# Patient Record
Sex: MALE | Race: BLACK OR AFRICAN AMERICAN | Employment: FULL TIME | URBAN - METROPOLITAN AREA
[De-identification: names, ages, dates, MRNs, and addresses within clinical notes are randomized per-mention and may not be internally consistent; named-entity substitution may affect disease eponyms.]

---

## 2017-05-01 NOTE — ED PROVIDER NOTES
Patient Seen in: THE [facility] In [place] END    History   Patient presents with:  Eval-G (gynecologic)  Mouth/Lip Problem    Stated Complaint: STD check    HPI  21-year-old male coming in with complaints of a funny cool sensation at the urethra, elements reviewed from today and agreed except as otherwise stated in HPI.     Physical Exam       ED Triage Vitals   BP 05/01/17 1225 114/71 mmHg   Pulse 05/01/17 1225 72   Resp 05/01/17 1225 16   Temp 05/01/17 1225 98.3 °F (36.8 °C)   Temp src 05/01/17 12 Encounter  POCT urinalysis dipstick Once  Chlamydia/Gc Amplification Once  CefTRIAXone Sodium (ROCEPHIN) 250 mg in Lidocaine HCl (XYLOCAINE) IM only syringe   Sig:   Order Specific Question: What infection is this being used to treat?   Answer: Sexually tra herpes labialis open (cold sores)  Monitor for signs of secondary infection.       Disposition and Plan     Clinical Impression:  High risk heterosexual behavior  (primary encounter diagnosis)  Routine screening for STI (sexually transmitted infection)  Hem

## 2017-07-31 ENCOUNTER — APPOINTMENT (OUTPATIENT)
Dept: ULTRASOUND IMAGING | Age: 22
End: 2017-07-31
Attending: PHYSICIAN ASSISTANT
Payer: COMMERCIAL

## 2017-07-31 ENCOUNTER — HOSPITAL ENCOUNTER (OUTPATIENT)
Age: 22
Discharge: HOME | End: 2017-07-31
Payer: COMMERCIAL

## 2017-07-31 VITALS
SYSTOLIC BLOOD PRESSURE: 130 MMHG | RESPIRATION RATE: 16 BRPM | BODY MASS INDEX: 23 KG/M2 | HEART RATE: 76 BPM | OXYGEN SATURATION: 98 % | DIASTOLIC BLOOD PRESSURE: 84 MMHG | TEMPERATURE: 98 F | WEIGHT: 185 LBS

## 2017-07-31 DIAGNOSIS — N50.3 EPIDIDYMAL CYST: Primary | ICD-10-CM

## 2017-07-31 DIAGNOSIS — R55 NEAR SYNCOPE: ICD-10-CM

## 2017-07-31 DIAGNOSIS — I86.1 VARICOCELE: ICD-10-CM

## 2017-07-31 LAB
#LYMPHOCYTE IC: 2.5 X10ˆ3/UL (ref 0.9–3.2)
#MXD IC: 0.6 X10ˆ3/UL (ref 0.1–1)
#NEUTROPHIL IC: 1.7 X10ˆ3/UL (ref 1.3–6.7)
CREAT SERPL-MCNC: 0.8 MG/DL (ref 0.7–1.2)
GLUCOSE BLD-MCNC: 84 MG/DL (ref 65–99)
HCT IC: 45.2 % (ref 37–54)
HGB IC: 15 G/DL (ref 13–17)
ISTAT BLOOD GAS TCO2: 27 MMOL/L (ref 22–32)
ISTAT BUN: 10 MG/DL (ref 8–20)
ISTAT CHLORIDE: 102 MMOL/L (ref 101–111)
ISTAT HEMATOCRIT: 47 % (ref 37–54)
ISTAT IONIZED CALCIUM: 1.21 MMOL/L (ref 1.12–1.32)
ISTAT POTASSIUM: 4.5 MMOL/L (ref 3.6–5.1)
ISTAT SODIUM: 140 MMOL/L (ref 136–144)
LYMPHOCYTES NFR BLD AUTO: 52.2 %
MCH IC: 28.2 PG (ref 27–33.2)
MCHC IC: 33.2 G/DL (ref 31–37)
MCV IC: 85 FL (ref 80–99)
MIXED CELL %: 11.8 %
NEUTROPHILS NFR BLD AUTO: 36 %
PLT IC: 277 X10ˆ3/UL (ref 150–450)
POCT BILIRUBIN URINE: NEGATIVE
POCT BLOOD URINE: NEGATIVE
POCT GLUCOSE URINE: NEGATIVE MG/DL
POCT KETONE URINE: NEGATIVE MG/DL
POCT LEUKOCYTE ESTERASE URINE: NEGATIVE
POCT NITRITE URINE: NEGATIVE
POCT PH URINE: 6 (ref 5–8)
POCT SPECIFIC GRAVITY URINE: 1.02
POCT URINE COLOR: YELLOW
POCT UROBILINOGEN URINE: 0.2 MG/DL
RBC IC: 5.32 X10ˆ6/UL (ref 4.3–5.7)
WBC IC: 4.8 X10ˆ3/UL (ref 4–13)

## 2017-07-31 PROCEDURE — 99215 OFFICE O/P EST HI 40 MIN: CPT

## 2017-07-31 PROCEDURE — 36415 COLL VENOUS BLD VENIPUNCTURE: CPT

## 2017-07-31 PROCEDURE — 93975 VASCULAR STUDY: CPT | Performed by: PHYSICIAN ASSISTANT

## 2017-07-31 PROCEDURE — 93005 ELECTROCARDIOGRAM TRACING: CPT

## 2017-07-31 PROCEDURE — 80047 BASIC METABLC PNL IONIZED CA: CPT

## 2017-07-31 PROCEDURE — 87591 N.GONORRHOEAE DNA AMP PROB: CPT | Performed by: PHYSICIAN ASSISTANT

## 2017-07-31 PROCEDURE — 81002 URINALYSIS NONAUTO W/O SCOPE: CPT | Performed by: PHYSICIAN ASSISTANT

## 2017-07-31 PROCEDURE — 87529 HSV DNA AMP PROBE: CPT | Performed by: PHYSICIAN ASSISTANT

## 2017-07-31 PROCEDURE — 87491 CHLMYD TRACH DNA AMP PROBE: CPT | Performed by: PHYSICIAN ASSISTANT

## 2017-07-31 PROCEDURE — 93010 ELECTROCARDIOGRAM REPORT: CPT

## 2017-07-31 PROCEDURE — 76870 US EXAM SCROTUM: CPT | Performed by: PHYSICIAN ASSISTANT

## 2017-07-31 PROCEDURE — 85025 COMPLETE CBC W/AUTO DIFF WBC: CPT | Performed by: PHYSICIAN ASSISTANT

## 2017-07-31 PROCEDURE — 99214 OFFICE O/P EST MOD 30 MIN: CPT

## 2017-07-31 NOTE — ED PROVIDER NOTES
Patient Seen in: [address] [facility] In [facility]    History   Patient presents with:  Abdomen/Flank Pain (GI/)    Stated Complaint: abd pain/groin pain/ x 3 days    HPI    Patient is a 27-year-old male who arrives for evaluation of multiple compla reviewed and negative except as noted above. PSFH elements reviewed from today and agreed except as otherwise stated in HPI.     Physical Exam   ED Triage Vitals [07/31/17 1158]  BP: 129/82  Pulse: 111  Resp: 20  Temp: 98.4 °F (36.9 °C)  Temp src: Tempor noted           ============================================================  ED Course  ------------------------------------------------------------   Us Scrotum W/ Doppler (cpt=93975/72321)    Result Date: 7/31/2017  PROCEDURE:  TESTICULAR ULTRASOUND WIT right epididymal cyst.  No torsion. No mass. I discussed these findings with patient. We discussed tighter fitting underwear, ice and ibuprofen. Decreased physical activity. Optional follow-up with urology. GC chlamydia are pending.   HSV PCR is pendi

## 2017-07-31 NOTE — ED INITIAL ASSESSMENT (HPI)
Patient states he was trying to have sexual intercourse the other day and experienced pain. Patient states he also had some redness to the throat and pain that has gone away.

## 2017-08-02 LAB
C TRACH DNA SPEC QL NAA+PROBE: NEGATIVE
HSV 1 SUBTYPE BY PCR: NOT DETECTED
HSV 2 SUBTYPE BY PCR: NOT DETECTED
N GONORRHOEA DNA SPEC QL NAA+PROBE: NEGATIVE

## 2017-11-17 ENCOUNTER — APPOINTMENT (OUTPATIENT)
Dept: GENERAL RADIOLOGY | Age: 22
End: 2017-11-17
Attending: FAMILY MEDICINE
Payer: COMMERCIAL

## 2017-11-17 ENCOUNTER — HOSPITAL ENCOUNTER (OUTPATIENT)
Age: 22
Discharge: HOME | End: 2017-11-17
Attending: FAMILY MEDICINE
Payer: COMMERCIAL

## 2017-11-17 VITALS
DIASTOLIC BLOOD PRESSURE: 75 MMHG | WEIGHT: 190 LBS | HEIGHT: 75 IN | SYSTOLIC BLOOD PRESSURE: 115 MMHG | RESPIRATION RATE: 16 BRPM | TEMPERATURE: 99 F | HEART RATE: 80 BPM | OXYGEN SATURATION: 99 % | BODY MASS INDEX: 23.62 KG/M2

## 2017-11-17 DIAGNOSIS — M62.830 BACK MUSCLE SPASM: ICD-10-CM
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
## 2017-11-17 DIAGNOSIS — S09.90XA INJURY OF HEAD, INITIAL ENCOUNTER: ICD-10-CM

## 2017-11-17 DIAGNOSIS — S16.1XXA STRAIN OF NECK MUSCLE, INITIAL ENCOUNTER: ICD-10-CM

## 2017-11-17 PROCEDURE — 96372 THER/PROPH/DIAG INJ SC/IM: CPT

## 2017-11-17 PROCEDURE — 99214 OFFICE O/P EST MOD 30 MIN: CPT

## 2017-11-17 PROCEDURE — 72110 X-RAY EXAM L-2 SPINE 4/>VWS: CPT | Performed by: FAMILY MEDICINE

## 2017-11-17 PROCEDURE — 72072 X-RAY EXAM THORAC SPINE 3VWS: CPT | Performed by: FAMILY MEDICINE

## 2017-11-17 RX ORDER — DIAZEPAM 2 MG/1
2 TABLET ORAL ONCE
Status: COMPLETED | OUTPATIENT
Start: 2017-11-17 | End: 2017-11-17

## 2017-11-17 RX ORDER — NAPROXEN 500 MG/1
500 TABLET ORAL 2 TIMES DAILY PRN
Qty: 20 TABLET | Refills: 0 | Status: SHIPPED | OUTPATIENT
Start: 2017-11-17 | End: 2017-11-24

## 2017-11-17 RX ORDER — KETOROLAC TROMETHAMINE 30 MG/ML
60 INJECTION, SOLUTION INTRAMUSCULAR; INTRAVENOUS ONCE
Status: COMPLETED | OUTPATIENT
Start: 2017-11-17 | End: 2017-11-17

## 2017-11-17 RX ORDER — CYCLOBENZAPRINE HCL 10 MG
TABLET ORAL 3 TIMES DAILY PRN
Qty: 12 TABLET | Refills: 0 | Status: SHIPPED | OUTPATIENT
Start: 2017-11-17 | End: 2017-11-20
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
## 2017-11-24 ENCOUNTER — TELEPHONE (OUTPATIENT)
Dept: FAMILY MEDICINE CLINIC | Facility: CLINIC | Age: 22
End: 2017-11-24
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
## 2018-01-01 ENCOUNTER — APPOINTMENT (OUTPATIENT)
Dept: GENERAL RADIOLOGY | Age: 23
End: 2018-01-01
Attending: FAMILY MEDICINE
Payer: COMMERCIAL

## 2018-01-01 ENCOUNTER — HOSPITAL ENCOUNTER (OUTPATIENT)
Age: 23
Discharge: HOME | End: 2018-01-01
Attending: FAMILY MEDICINE
Payer: COMMERCIAL

## 2018-01-01 VITALS
HEIGHT: 74 IN | DIASTOLIC BLOOD PRESSURE: 85 MMHG | TEMPERATURE: 100 F | WEIGHT: 180 LBS | OXYGEN SATURATION: 100 % | RESPIRATION RATE: 18 BRPM | HEART RATE: 107 BPM | SYSTOLIC BLOOD PRESSURE: 120 MMHG | BODY MASS INDEX: 23.1 KG/M2

## 2018-01-01 DIAGNOSIS — F07.81 POST CONCUSSION SYNDROME: ICD-10-CM

## 2018-01-01 DIAGNOSIS — G44.321 INTRACTABLE CHRONIC POST-TRAUMATIC HEADACHE: ICD-10-CM

## 2018-01-01 DIAGNOSIS — S39.92XD INJURY OF LOW BACK, SUBSEQUENT ENCOUNTER: ICD-10-CM

## 2018-01-01 DIAGNOSIS — R07.89 MUSCULOSKELETAL CHEST PAIN: Primary | ICD-10-CM

## 2018-01-01 PROCEDURE — 99214 OFFICE O/P EST MOD 30 MIN: CPT

## 2018-01-01 PROCEDURE — 72110 X-RAY EXAM L-2 SPINE 4/>VWS: CPT | Performed by: FAMILY MEDICINE

## 2018-01-01 PROCEDURE — 71046 X-RAY EXAM CHEST 2 VIEWS: CPT | Performed by: FAMILY MEDICINE

## 2018-01-01 PROCEDURE — 96372 THER/PROPH/DIAG INJ SC/IM: CPT

## 2018-01-01 RX ORDER — MECLIZINE HCL 12.5 MG/1
25 TABLET ORAL ONCE
Status: COMPLETED | OUTPATIENT
Start: 2018-01-01 | End: 2018-01-01

## 2018-01-01 RX ORDER — HYDROCODONE BITARTRATE AND ACETAMINOPHEN 5; 325 MG/1; MG/1
1 TABLET ORAL EVERY 6 HOURS PRN
Qty: 30 TABLET | Refills: 1 | Status: SHIPPED | OUTPATIENT
Start: 2018-01-01 | End: 2018-01-08

## 2018-01-01 RX ORDER — KETOROLAC TROMETHAMINE 30 MG/ML
60 INJECTION, SOLUTION INTRAMUSCULAR; INTRAVENOUS ONCE
Status: COMPLETED | OUTPATIENT
Start: 2018-01-01 | End: 2018-01-01

## 2018-01-01 RX ORDER — CYCLOBENZAPRINE HCL 10 MG
10 TABLET ORAL 3 TIMES DAILY PRN
Qty: 24 TABLET | Refills: 0 | Status: SHIPPED | OUTPATIENT
Start: 2018-01-01 | End: 2018-01-08
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
## 2018-01-08 ENCOUNTER — OFFICE VISIT (OUTPATIENT)
Dept: INTERNAL MEDICINE CLINIC | Facility: CLINIC | Age: 23
End: 2018-01-08

## 2018-01-08 VITALS
HEIGHT: 75 IN | SYSTOLIC BLOOD PRESSURE: 110 MMHG | TEMPERATURE: 99 F | DIASTOLIC BLOOD PRESSURE: 70 MMHG | BODY MASS INDEX: 23.75 KG/M2 | RESPIRATION RATE: 16 BRPM | OXYGEN SATURATION: 99 % | WEIGHT: 191 LBS | HEART RATE: 104 BPM

## 2018-01-08 DIAGNOSIS — R42 DIZZINESS: ICD-10-CM

## 2018-01-08 DIAGNOSIS — G89.29 CHRONIC MIDLINE LOW BACK PAIN WITHOUT SCIATICA: ICD-10-CM

## 2018-01-08 DIAGNOSIS — G44.309 POST-CONCUSSION HEADACHE: Primary | ICD-10-CM

## 2018-01-08 DIAGNOSIS — M54.50 CHRONIC MIDLINE LOW BACK PAIN WITHOUT SCIATICA: ICD-10-CM

## 2018-01-08 DIAGNOSIS — H53.2 DIPLOPIA: ICD-10-CM
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
## 2018-01-08 DIAGNOSIS — Z72.0 TOBACCO ABUSE: ICD-10-CM

## 2018-01-08 PROCEDURE — 99204 OFFICE O/P NEW MOD 45 MIN: CPT | Performed by: PHYSICIAN ASSISTANT

## 2018-01-08 RX ORDER — METHYLPREDNISOLONE 4 MG/1
TABLET ORAL
Qty: 1 KIT | Refills: 0 | Status: SHIPPED | OUTPATIENT
Start: 2018-01-08 | End: 2018-06-20 | Stop reason: ALTCHOICE

## 2018-01-08 RX ORDER — AMITRIPTYLINE HYDROCHLORIDE 25 MG/1
25 TABLET, FILM COATED ORAL NIGHTLY
Qty: 30 TABLET | Refills: 0 | Status: SHIPPED | OUTPATIENT
Start: 2018-01-08 | End: 2018-06-20 | Stop reason: ALTCHOICE
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
## 2018-01-08 NOTE — PATIENT INSTRUCTIONS
Please call Jaison Gallegos at [phone] to set up the following tests:  - MRI of brain  - MRI of lumbar spine    Headaches:  - start amitriptyline 25 mg - take 1 tablet each night before bed  - follow up with neurology (Dr. Clau Schwartz

## 2018-01-10 PROBLEM — F07.81 POST CONCUSSION SYNDROME: Status: ACTIVE | Noted: 2018-01-10
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
## 2018-01-10 NOTE — PATIENT INSTRUCTIONS
Refill policies:    • Allow 2-3 business days for refills; controlled substances may take longer.   • Contact your pharmacy at least 5 days prior to running out of medication and have them send an electronic request or submit request through the [facility] have a procedure or additional testing performed. MARIANA REAVES [facility]) will contact your insurance carrier to obtain pre-certification or prior authorization.     Unfortunately, [facility] has seen an increase in denial of payment even though the p

## 2018-01-10 NOTE — PROGRESS NOTES
Neurology H&P    Wyline Meter Patient Status:  No patient class for patient encounter    1995 MRN [id number]   Location [facility], [address], [address] Attending No att. providers found   [facility] Day # 0 PCP Marie Winslow MD MG Oral Tab Take 1 tablet (25 mg total) by mouth nightly.  Disp: 30 tablet Rfl: 0       Problem List:  Patient Active Problem List:     Tobacco abuse      PMHx:  Past Medical History:   Diagnosis Date   • Migraines        PSHx:  No past surgical history on elevation intact, no dysarthria, no tongue fasciculations or atrophy, strong shoulder shrug.     MOTOR EXAMINATION: normal tone, no fasciculations, normal strength throughout in UEs and LEs     SENSORY EXAMINATION:  UE: intact to light touch, pinprick is sp

## 2018-01-26 ENCOUNTER — TELEPHONE (OUTPATIENT)
Dept: NEUROLOGY | Facility: CLINIC | Age: 23
End: 2018-01-26

## 2018-06-20 ENCOUNTER — OFFICE VISIT (OUTPATIENT)
Dept: INTERNAL MEDICINE CLINIC | Facility: CLINIC | Age: 23
End: 2018-06-20

## 2018-06-20 ENCOUNTER — APPOINTMENT (OUTPATIENT)
Dept: LAB | Age: 23
End: 2018-06-20
Attending: PHYSICIAN ASSISTANT
Payer: MEDICAID

## 2018-06-20 VITALS
HEART RATE: 71 BPM | TEMPERATURE: 99 F | RESPIRATION RATE: 14 BRPM | WEIGHT: 191 LBS | SYSTOLIC BLOOD PRESSURE: 118 MMHG | OXYGEN SATURATION: 98 % | DIASTOLIC BLOOD PRESSURE: 80 MMHG | HEIGHT: 75 IN | BODY MASS INDEX: 23.75 KG/M2

## 2018-06-20 DIAGNOSIS — Z11.3 SCREEN FOR STD (SEXUALLY TRANSMITTED DISEASE): ICD-10-CM

## 2018-06-20 DIAGNOSIS — M54.50 CHRONIC BILATERAL LOW BACK PAIN WITHOUT SCIATICA: ICD-10-CM

## 2018-06-20 DIAGNOSIS — G89.29 CHRONIC BILATERAL LOW BACK PAIN WITHOUT SCIATICA: ICD-10-CM

## 2018-06-20 DIAGNOSIS — Z00.00 ANNUAL PHYSICAL EXAM: ICD-10-CM

## 2018-06-20 DIAGNOSIS — F07.81 POST CONCUSSION SYNDROME: ICD-10-CM

## 2018-06-20 DIAGNOSIS — Z00.00 ANNUAL PHYSICAL EXAM: Primary | ICD-10-CM

## 2018-06-20 PROCEDURE — 99395 PREV VISIT EST AGE 18-39: CPT | Performed by: PHYSICIAN ASSISTANT

## 2018-06-20 PROCEDURE — 87591 N.GONORRHOEAE DNA AMP PROB: CPT

## 2018-06-20 PROCEDURE — 87491 CHLMYD TRACH DNA AMP PROBE: CPT
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
## 2018-06-20 NOTE — PATIENT INSTRUCTIONS
Please get the following vaccines at the pharmacy or [facility] department:  - Varicella (chicken pox)  - Gardasil (HPV)  *please bring documentation for Dawson Pulliam    Please call Jaison Gallegos at [phone] to set up the following t

## 2018-06-21 DIAGNOSIS — Z11.3 SCREEN FOR STD (SEXUALLY TRANSMITTED DISEASE): Primary | ICD-10-CM

## 2018-06-21 RX ORDER — AZITHROMYCIN 500 MG/1
1000 TABLET, FILM COATED ORAL ONCE
Qty: 2 TABLET | Refills: 0 | Status: SHIPPED | OUTPATIENT
Start: 2018-06-21 | End: 2018-06-21

## 2018-06-22 ENCOUNTER — TELEPHONE (OUTPATIENT)
Dept: INTERNAL MEDICINE CLINIC | Facility: CLINIC | Age: 23
End: 2018-06-22

## 2018-06-22 RX ORDER — AZITHROMYCIN 500 MG/1
500 TABLET, FILM COATED ORAL DAILY
Qty: 2 TABLET | Refills: 0 | Status: SHIPPED | OUTPATIENT
Start: 2018-06-22 | End: 2018-08-10 | Stop reason: ALTCHOICE

## 2018-08-10 ENCOUNTER — HOSPITAL ENCOUNTER (OUTPATIENT)
Age: 23
Discharge: HOME | End: 2018-08-10
Attending: FAMILY MEDICINE
Payer: MEDICAID

## 2018-08-10 VITALS
TEMPERATURE: 98 F | OXYGEN SATURATION: 100 % | DIASTOLIC BLOOD PRESSURE: 65 MMHG | RESPIRATION RATE: 18 BRPM | HEART RATE: 86 BPM | SYSTOLIC BLOOD PRESSURE: 124 MMHG

## 2018-08-10 DIAGNOSIS — R21 RASH: ICD-10-CM

## 2018-08-10 DIAGNOSIS — Z71.1 CONCERN ABOUT STD IN MALE WITHOUT DIAGNOSIS: Primary | ICD-10-CM

## 2018-08-10 LAB
POCT BILIRUBIN URINE: NEGATIVE
POCT BLOOD URINE: NEGATIVE
POCT GLUCOSE URINE: NEGATIVE MG/DL
POCT KETONE URINE: NEGATIVE MG/DL
POCT LEUKOCYTE ESTERASE URINE: NEGATIVE
POCT NITRITE URINE: NEGATIVE
POCT PH URINE: 6.5 (ref 5–8)
POCT PROTEIN URINE: 30 MG/DL
POCT SPECIFIC GRAVITY URINE: 1.03
POCT URINE CLARITY: CLEAR
POCT URINE COLOR: YELLOW
POCT UROBILINOGEN URINE: 1 MG/DL

## 2018-08-10 PROCEDURE — 81002 URINALYSIS NONAUTO W/O SCOPE: CPT | Performed by: FAMILY MEDICINE

## 2018-08-10 PROCEDURE — 99213 OFFICE O/P EST LOW 20 MIN: CPT

## 2018-08-10 PROCEDURE — 87491 CHLMYD TRACH DNA AMP PROBE: CPT | Performed by: FAMILY MEDICINE

## 2018-08-10 PROCEDURE — 87591 N.GONORRHOEAE DNA AMP PROB: CPT | Performed by: FAMILY MEDICINE

## 2018-08-10 RX ORDER — TRIAMCINOLONE ACETONIDE 0.25 MG/G
1 CREAM TOPICAL 2 TIMES DAILY
Qty: 15 G | Refills: 0 | Status: SHIPPED | OUTPATIENT
Start: 2018-08-10 | End: 2018-10-17

## 2018-08-10 NOTE — ED PROVIDER NOTES
Patient Seen in: [facility]    History   Patient presents with:  STD    Stated Complaint: \"needs STD shot,' x7days     HPI    21-year-old male comes immediate care secondary to recent treatment for chlamydia.   Patient was posit Result Value    Protein urine 30  (*)     All other components within normal limits   CHLAMYDIA/GONOCOCCUS, AIDAN       ED Course as of Aug 10 1100  ------------------------------------------------------------      MDM   Patient was positive for chlamydia in

## 2018-08-10 NOTE — ED INITIAL ASSESSMENT (HPI)
Seen 2 weeks ago at PCP office and told he had Chlamydia. Given \"2 pills\" to take. Sts that he took them, but sts that he had drank alcohol and was told that made the pills ineffective. Sts that he still has some burning w/ urination.

## 2018-08-12 LAB
C TRACH DNA SPEC QL NAA+PROBE: NEGATIVE
N GONORRHOEA DNA SPEC QL NAA+PROBE: NEGATIVE

## 2018-10-17 ENCOUNTER — LAB ENCOUNTER (OUTPATIENT)
Dept: LAB | Age: 23
End: 2018-10-17
Attending: INTERNAL MEDICINE
Payer: MEDICAID

## 2018-10-17 ENCOUNTER — OFFICE VISIT (OUTPATIENT)
Dept: INTERNAL MEDICINE CLINIC | Facility: CLINIC | Age: 23
End: 2018-10-17
Payer: MEDICAID

## 2018-10-17 VITALS
HEIGHT: 75 IN | HEART RATE: 80 BPM | BODY MASS INDEX: 23.81 KG/M2 | RESPIRATION RATE: 18 BRPM | WEIGHT: 191.5 LBS | DIASTOLIC BLOOD PRESSURE: 60 MMHG | SYSTOLIC BLOOD PRESSURE: 108 MMHG | TEMPERATURE: 99 F

## 2018-10-17 DIAGNOSIS — R36.9 PENILE DISCHARGE: ICD-10-CM

## 2018-10-17 DIAGNOSIS — R30.0 DYSURIA: ICD-10-CM

## 2018-10-17 DIAGNOSIS — R36.9 PENILE DISCHARGE: Primary | ICD-10-CM

## 2018-10-17 PROCEDURE — 87086 URINE CULTURE/COLONY COUNT: CPT

## 2018-10-17 PROCEDURE — 87491 CHLMYD TRACH DNA AMP PROBE: CPT

## 2018-10-17 PROCEDURE — 36415 COLL VENOUS BLD VENIPUNCTURE: CPT

## 2018-10-17 PROCEDURE — 99213 OFFICE O/P EST LOW 20 MIN: CPT | Performed by: INTERNAL MEDICINE

## 2018-10-17 PROCEDURE — 81003 URINALYSIS AUTO W/O SCOPE: CPT

## 2018-10-17 PROCEDURE — 87591 N.GONORRHOEAE DNA AMP PROB: CPT

## 2018-10-17 PROCEDURE — 86780 TREPONEMA PALLIDUM: CPT

## 2018-10-17 PROCEDURE — 87389 HIV-1 AG W/HIV-1&-2 AB AG IA: CPT

## 2018-10-17 RX ORDER — TRIAMCINOLONE ACETONIDE 0.25 MG/G
1 CREAM TOPICAL 2 TIMES DAILY
Qty: 80 G | Refills: 1 | Status: SHIPPED | OUTPATIENT
Start: 2018-10-17 | End: 2019-10-11

## 2018-10-17 NOTE — PATIENT INSTRUCTIONS
- Get testing done across the mock at the immediate care  - We will contact you with the results and send in a prescription if needed. - Steroid cream refilled to you pharmacy. It was a pleasure seeing you in the clinic today.   Thank you for choosing t

## 2018-10-17 NOTE — PROGRESS NOTES
Bridget Fallon is a 26year old male. HPI:   Patient presents with:  Chlamydia: patient thinks he may have chlamydia. did have intercourse a few weeks ago. Patient presents with acute genital symptoms.    Dealing with some dysuria - tingling be 180 lb  01/08/18 : 191 lb  01/01/18 : 180 lb  11/17/17 : 190 lb    EXAM:   /60 (BP Location: Left arm, Patient Position: Sitting, Cuff Size: adult)   Pulse 80   Temp 98.6 °F (37 °C) (Oral)   Resp 18   Ht 75\"   Wt 191 lb 8 oz   BMI 23.94 kg/m²   GENE

## 2018-10-19 ENCOUNTER — TELEPHONE (OUTPATIENT)
Dept: INTERNAL MEDICINE CLINIC | Facility: CLINIC | Age: 23
End: 2018-10-19

## 2018-10-19 RX ORDER — AZITHROMYCIN 500 MG/1
1000 TABLET, FILM COATED ORAL ONCE
Qty: 2 TABLET | Refills: 0 | Status: SHIPPED | OUTPATIENT
Start: 2018-10-19 | End: 2018-10-19

## 2018-10-19 NOTE — TELEPHONE ENCOUNTER
Called pt - he stated that he spoke with Dr. Casi Mireles not too long ago. Pt had no further questions.

## 2018-10-29 ENCOUNTER — TELEPHONE (OUTPATIENT)
Dept: INTERNAL MEDICINE CLINIC | Facility: CLINIC | Age: 23
End: 2018-10-29

## 2018-10-29 NOTE — TELEPHONE ENCOUNTER
Jordana Mcginnis called back and is requesting what medication was prescribed. Notified her that azithromycin was prescribed. She verbalized understanding.

## 2018-10-29 NOTE — TELEPHONE ENCOUNTER
[facility]; please call back to discuss treament

## 2019-01-09 ENCOUNTER — HOSPITAL ENCOUNTER (OUTPATIENT)
Age: 24
Discharge: HOME | End: 2019-01-09
Attending: FAMILY MEDICINE
Payer: MEDICAID

## 2019-01-09 VITALS
HEIGHT: 75 IN | OXYGEN SATURATION: 100 % | TEMPERATURE: 98 F | DIASTOLIC BLOOD PRESSURE: 68 MMHG | HEART RATE: 79 BPM | RESPIRATION RATE: 18 BRPM | SYSTOLIC BLOOD PRESSURE: 117 MMHG | WEIGHT: 185 LBS | BODY MASS INDEX: 23 KG/M2

## 2019-01-09 DIAGNOSIS — K52.9 GASTROENTERITIS: Primary | ICD-10-CM

## 2019-01-09 LAB
#LYMPHOCYTE IC: 0.9 X10ˆ3/UL (ref 0.9–3.2)
#MXD IC: 0.3 X10ˆ3/UL (ref 0.1–1)
#NEUTROPHIL IC: 7.3 X10ˆ3/UL (ref 1.3–6.7)
CREAT SERPL-MCNC: 0.7 MG/DL (ref 0.7–1.3)
GLUCOSE BLD-MCNC: 91 MG/DL (ref 70–99)
HCT IC: 43.8 % (ref 37–54)
HGB IC: 14.5 G/DL (ref 13–17)
ISTAT BUN: 6 MG/DL (ref 8–20)
ISTAT CHLORIDE: 100 MMOL/L (ref 101–111)
ISTAT HEMATOCRIT: 44 % (ref 37–53)
ISTAT IONIZED CALCIUM: 1.18 MMOL/L
ISTAT POTASSIUM: 3.9 MMOL/L (ref 3.6–5.1)
ISTAT SODIUM: 140 MMOL/L (ref 136–144)
LYMPHOCYTES NFR BLD AUTO: 10.1 %
MCH IC: 29 PG (ref 27–33.2)
MCHC IC: 33.1 G/DL (ref 31–37)
MCV IC: 87.6 FL (ref 80–99)
MIXED CELL %: 3.3 %
NEUTROPHILS NFR BLD AUTO: 86.6 %
PLT IC: 250 X10ˆ3/UL (ref 150–450)
RBC IC: 5 X10ˆ6/UL (ref 4.3–5.7)
WBC IC: 8.5 X10ˆ3/UL (ref 4–13)

## 2019-01-09 PROCEDURE — 99214 OFFICE O/P EST MOD 30 MIN: CPT

## 2019-01-09 PROCEDURE — 80047 BASIC METABLC PNL IONIZED CA: CPT

## 2019-01-09 PROCEDURE — 96361 HYDRATE IV INFUSION ADD-ON: CPT

## 2019-01-09 PROCEDURE — 85025 COMPLETE CBC W/AUTO DIFF WBC: CPT | Performed by: FAMILY MEDICINE

## 2019-01-09 PROCEDURE — 96374 THER/PROPH/DIAG INJ IV PUSH: CPT

## 2019-01-09 RX ORDER — ONDANSETRON 2 MG/ML
4 INJECTION INTRAMUSCULAR; INTRAVENOUS ONCE
Status: COMPLETED | OUTPATIENT
Start: 2019-01-09 | End: 2019-01-09

## 2019-01-09 RX ORDER — ONDANSETRON 4 MG/1
4 TABLET, ORALLY DISINTEGRATING ORAL ONCE
Status: COMPLETED | OUTPATIENT
Start: 2019-01-09 | End: 2019-01-09

## 2019-01-09 RX ORDER — ONDANSETRON 4 MG/1
4 TABLET, ORALLY DISINTEGRATING ORAL EVERY 6 HOURS PRN
Qty: 12 TABLET | Refills: 0 | Status: SHIPPED | OUTPATIENT
Start: 2019-01-09 | End: 2019-01-12

## 2019-01-09 RX ORDER — SODIUM CHLORIDE 9 MG/ML
1000 INJECTION, SOLUTION INTRAVENOUS ONCE
Status: COMPLETED | OUTPATIENT
Start: 2019-01-09 | End: 2019-01-09

## 2019-01-09 NOTE — ED INITIAL ASSESSMENT (HPI)
Pt presents today with c/o vomiting and diarrhea since 0300 this morning. Pt states that he has generalized abdominal pain. Pt denies any urinary complaints or fevers.

## 2019-01-11 NOTE — ED PROVIDER NOTES
Patient Seen in: [facility]    History   Patient presents with:  Vomiting  Diarrhea    Stated Complaint: vomitting and diar since 3 am     HPI  This is a 21-year-old male coming in with complaints of vomiting and diarrhea since point tenderness, no McBurney's point tenderness, no Kaur's sign, no guarding, no rebound, not distended  NEURO: Alert and oriented to person place and time  GAIT: Normal          ED Course     Labs Reviewed   POCT CBC - Abnormal; Notable for the followi Disposition and Plan     Clinical Impression:  Gastroenteritis  (primary encounter diagnosis)    Disposition:  Discharge  1/9/2019  3:49 pm    Follow-up:  Ellie Sánchez MD  [address] [phone]

## 2019-02-01 ENCOUNTER — OFFICE VISIT (OUTPATIENT)
Dept: INTERNAL MEDICINE CLINIC | Facility: CLINIC | Age: 24
End: 2019-02-01
Payer: MEDICAID

## 2019-02-01 VITALS
HEIGHT: 74.5 IN | RESPIRATION RATE: 12 BRPM | OXYGEN SATURATION: 99 % | HEART RATE: 77 BPM | TEMPERATURE: 98 F | WEIGHT: 194 LBS | BODY MASS INDEX: 24.64 KG/M2 | DIASTOLIC BLOOD PRESSURE: 66 MMHG | SYSTOLIC BLOOD PRESSURE: 98 MMHG

## 2019-02-01 DIAGNOSIS — R36.9 PENILE DISCHARGE: Primary | ICD-10-CM

## 2019-02-01 DIAGNOSIS — N48.89 PENILE IRRITATION: ICD-10-CM

## 2019-02-01 DIAGNOSIS — R30.0 DYSURIA: ICD-10-CM

## 2019-02-01 DIAGNOSIS — L98.9 SKIN LESION: ICD-10-CM

## 2019-02-01 PROCEDURE — 87491 CHLMYD TRACH DNA AMP PROBE: CPT | Performed by: PHYSICIAN ASSISTANT

## 2019-02-01 PROCEDURE — 99214 OFFICE O/P EST MOD 30 MIN: CPT | Performed by: PHYSICIAN ASSISTANT

## 2019-02-01 PROCEDURE — 87529 HSV DNA AMP PROBE: CPT | Performed by: PHYSICIAN ASSISTANT

## 2019-02-01 PROCEDURE — 87591 N.GONORRHOEAE DNA AMP PROB: CPT | Performed by: PHYSICIAN ASSISTANT

## 2019-02-01 NOTE — PROGRESS NOTES
Christine Shelby is a 27year old male. HPI:   Patient presents for  symptoms x 1-2 weeks. C/o burning with urination, redness/irritation/tingling of tip of penis. Also c/o increased urinary frequency.   Developed a pimple like skin lesion o SpO2 99%   BMI 24.57 kg/m²   GENERAL: well developed, well nourished, in no acute distress  SKIN: no rashes, no suspicious lesions  HEENT: normocephalic, atraumatic, conjunctiva clear, OP clear, MMM  NECK: supple, no thyromegaly, no lymphadenopathy  LUNGS:

## 2019-02-01 NOTE — PATIENT INSTRUCTIONS
Refrain from intercourse. Antibiotics pending lab results. If positive for an STD recommend additional lab testing and you will need to notify all recent sexual partners so they may be tested.     Please see dermatology regarding penile skin irritatio

## 2019-02-03 LAB
C TRACH DNA SPEC QL NAA+PROBE: POSITIVE
N GONORRHOEA DNA SPEC QL NAA+PROBE: NEGATIVE

## 2019-02-04 DIAGNOSIS — Z11.3 SCREEN FOR STD (SEXUALLY TRANSMITTED DISEASE): Primary | ICD-10-CM

## 2019-02-04 RX ORDER — AZITHROMYCIN 500 MG/1
1000 TABLET, FILM COATED ORAL ONCE
Qty: 2 TABLET | Refills: 0 | Status: SHIPPED | OUTPATIENT
Start: 2019-02-04 | End: 2019-02-04

## 2019-02-05 ENCOUNTER — TELEPHONE (OUTPATIENT)
Dept: INTERNAL MEDICINE CLINIC | Facility: CLINIC | Age: 24
End: 2019-02-05

## 2019-02-05 LAB
HSV 1 SUBTYPE BY PCR: NOT DETECTED
HSV 2 SUBTYPE BY PCR: NOT DETECTED

## 2019-02-05 NOTE — TELEPHONE ENCOUNTER
Eyad with [address] [employer] called in regarding positive chlamydia results. She was inquiring what medication pt was RX, where med was sent and if pt was notified of RX/results.      Eyad informed that pt was notified of positive test and RX o

## 2019-02-08 ENCOUNTER — APPOINTMENT (OUTPATIENT)
Dept: LAB | Age: 24
End: 2019-02-08
Attending: PHYSICIAN ASSISTANT
Payer: MEDICAID

## 2019-02-08 DIAGNOSIS — Z11.3 SCREEN FOR STD (SEXUALLY TRANSMITTED DISEASE): ICD-10-CM

## 2019-02-08 LAB
HAV IGM SER QL: NONREACTIVE
HBV CORE IGM SER QL: NONREACTIVE
HBV SURFACE AG SERPL QL IA: NONREACTIVE
HCV AB SERPL QL IA: NONREACTIVE
T PALLIDUM AB SER QL IA: NONREACTIVE

## 2019-02-08 PROCEDURE — 36415 COLL VENOUS BLD VENIPUNCTURE: CPT

## 2019-02-08 PROCEDURE — 87389 HIV-1 AG W/HIV-1&-2 AB AG IA: CPT

## 2019-02-08 PROCEDURE — 80074 ACUTE HEPATITIS PANEL: CPT

## 2019-02-08 PROCEDURE — 86780 TREPONEMA PALLIDUM: CPT

## 2019-06-15 ENCOUNTER — HOSPITAL ENCOUNTER (OUTPATIENT)
Age: 24
Discharge: HOME | End: 2019-06-15
Attending: FAMILY MEDICINE
Payer: MEDICAID

## 2019-06-15 VITALS
RESPIRATION RATE: 16 BRPM | DIASTOLIC BLOOD PRESSURE: 68 MMHG | SYSTOLIC BLOOD PRESSURE: 117 MMHG | TEMPERATURE: 98 F | OXYGEN SATURATION: 98 % | HEART RATE: 90 BPM

## 2019-06-15 DIAGNOSIS — B37.49 YEAST DERMATITIS OF PENIS: Primary | ICD-10-CM

## 2019-06-15 PROCEDURE — 99214 OFFICE O/P EST MOD 30 MIN: CPT

## 2019-06-15 PROCEDURE — 99213 OFFICE O/P EST LOW 20 MIN: CPT

## 2019-06-15 RX ORDER — KETOCONAZOLE 20 MG/G
1 CREAM TOPICAL 2 TIMES DAILY
Qty: 30 G | Refills: 0 | Status: SHIPPED | OUTPATIENT
Start: 2019-06-15 | End: 2019-07-15

## 2019-07-27 ENCOUNTER — HOSPITAL ENCOUNTER (OUTPATIENT)
Age: 24
Discharge: HOME | End: 2019-07-27
Payer: MEDICAID

## 2019-07-27 ENCOUNTER — APPOINTMENT (OUTPATIENT)
Dept: GENERAL RADIOLOGY | Age: 24
End: 2019-07-27
Attending: NURSE PRACTITIONER
Payer: MEDICAID

## 2019-07-27 VITALS
TEMPERATURE: 98 F | OXYGEN SATURATION: 100 % | HEIGHT: 75 IN | RESPIRATION RATE: 20 BRPM | WEIGHT: 195 LBS | DIASTOLIC BLOOD PRESSURE: 77 MMHG | HEART RATE: 94 BPM | SYSTOLIC BLOOD PRESSURE: 128 MMHG | BODY MASS INDEX: 24.25 KG/M2

## 2019-07-27 DIAGNOSIS — S93.402A MILD SPRAIN OF LEFT ANKLE, INITIAL ENCOUNTER: Primary | ICD-10-CM

## 2019-07-27 DIAGNOSIS — S60.221A CONTUSION OF RIGHT HAND, INITIAL ENCOUNTER: ICD-10-CM

## 2019-07-27 PROCEDURE — 73130 X-RAY EXAM OF HAND: CPT | Performed by: NURSE PRACTITIONER

## 2019-07-27 PROCEDURE — 99214 OFFICE O/P EST MOD 30 MIN: CPT

## 2019-07-27 PROCEDURE — 73610 X-RAY EXAM OF ANKLE: CPT | Performed by: NURSE PRACTITIONER

## 2019-07-27 NOTE — ED PROVIDER NOTES
Patient Seen in: [facility]    History   Patient presents with:  Lower Extremity Injury (musculoskeletal)    Stated Complaint: FRACTURE ON LEFT ANKLE/PALM    82-year-old male who presents to the immediate care with complaints of above.    Physical Exam     ED Triage Vitals   BP 07/27/19 1502 128/77   Pulse 07/27/19 1502 94   Resp 07/27/19 1502 20   Temp 07/27/19 1512 98.3 °F (36.8 °C)   Temp src 07/27/19 1512 Oral   SpO2 07/27/19 1502 100 %   O2 Device 07/27/19 1502 None (Room air or osseous abnormality. CONCLUSION:  No abnormality demonstrated in the left ankle.    Dictated by: Ish Griffiths MD on 7/27/2019 at 15:40     Approved by: Ish Griffiths MD            Xr Hand (min 3 Views), Right (cpt=73130)    Result Date: 7/27/2019 Prescribed:  Discharge Medication List as of 7/27/2019  3:59 PM

## 2019-09-24 ENCOUNTER — HOSPITAL ENCOUNTER (OUTPATIENT)
Age: 24
Discharge: HOME | End: 2019-09-24
Attending: FAMILY MEDICINE
Payer: MEDICAID

## 2019-09-24 VITALS
DIASTOLIC BLOOD PRESSURE: 68 MMHG | SYSTOLIC BLOOD PRESSURE: 122 MMHG | HEIGHT: 75 IN | RESPIRATION RATE: 18 BRPM | WEIGHT: 187 LBS | BODY MASS INDEX: 23.25 KG/M2 | OXYGEN SATURATION: 100 % | TEMPERATURE: 98 F | HEART RATE: 68 BPM

## 2019-09-24 DIAGNOSIS — N34.2 URETHRITIS: Primary | ICD-10-CM

## 2019-09-24 LAB
POCT BILIRUBIN URINE: NEGATIVE
POCT BLOOD URINE: NEGATIVE
POCT GLUCOSE URINE: NEGATIVE MG/DL
POCT KETONE URINE: NEGATIVE MG/DL
POCT LEUKOCYTE ESTERASE URINE: NEGATIVE
POCT NITRITE URINE: NEGATIVE
POCT PH URINE: 5.5 (ref 5–8)
POCT PROTEIN URINE: NEGATIVE MG/DL
POCT SPECIFIC GRAVITY URINE: 1.03
POCT URINE CLARITY: CLEAR
POCT URINE COLOR: YELLOW
POCT UROBILINOGEN URINE: 0.2 MG/DL

## 2019-09-24 PROCEDURE — 96372 THER/PROPH/DIAG INJ SC/IM: CPT

## 2019-09-24 PROCEDURE — 99214 OFFICE O/P EST MOD 30 MIN: CPT

## 2019-09-24 PROCEDURE — 87591 N.GONORRHOEAE DNA AMP PROB: CPT | Performed by: FAMILY MEDICINE

## 2019-09-24 PROCEDURE — 81002 URINALYSIS NONAUTO W/O SCOPE: CPT | Performed by: FAMILY MEDICINE

## 2019-09-24 PROCEDURE — 87491 CHLMYD TRACH DNA AMP PROBE: CPT | Performed by: FAMILY MEDICINE

## 2019-09-24 RX ORDER — AZITHROMYCIN 250 MG/1
1000 TABLET, FILM COATED ORAL ONCE
Status: COMPLETED | OUTPATIENT
Start: 2019-09-24 | End: 2019-09-24

## 2019-09-24 NOTE — ED INITIAL ASSESSMENT (HPI)
Penile discharge for one week  Burns with urination  Has had chlamydia- months ago  Uses condoms occasionally

## 2019-09-25 LAB
C TRACH DNA SPEC QL NAA+PROBE: NEGATIVE
N GONORRHOEA DNA SPEC QL NAA+PROBE: NEGATIVE

## 2019-09-25 NOTE — ED NOTES
Patient returned call. Name and date of birth verified. Patient was given [facility] [employer] results. Patient instructed to follow up with his PCP as directed on discharge.

## 2019-09-26 NOTE — ED PROVIDER NOTES
Patient Seen in: [facility] END      History   Patient presents with:  Eval-G (genital)    Stated Complaint: g andriy    HPI    27year old male presents for penile discharge and dysuria.  States he has intermittent penile discharge fo Reviewed   POCT URINALYSIS DIPSTICK - Normal   CHLAMYDIA/GONOCOCCUS, AIDAN       MDM     Patient presents for penile discharge and dysuria. Urine dipstick was normal. Urine GC/chl was ordered. Patient was given Rocephin 250 mg IM and Zithromax 1 gm po.  Vita Mejia

## 2019-10-01 ENCOUNTER — LAB ENCOUNTER (OUTPATIENT)
Dept: LAB | Age: 24
End: 2019-10-01
Attending: NURSE PRACTITIONER
Payer: MEDICAID

## 2019-10-01 ENCOUNTER — OFFICE VISIT (OUTPATIENT)
Dept: INTERNAL MEDICINE CLINIC | Facility: CLINIC | Age: 24
End: 2019-10-01
Payer: MEDICAID

## 2019-10-01 VITALS
HEIGHT: 74.5 IN | BODY MASS INDEX: 23.94 KG/M2 | SYSTOLIC BLOOD PRESSURE: 110 MMHG | WEIGHT: 188.5 LBS | OXYGEN SATURATION: 98 % | RESPIRATION RATE: 16 BRPM | HEART RATE: 77 BPM | DIASTOLIC BLOOD PRESSURE: 72 MMHG | TEMPERATURE: 99 F

## 2019-10-01 DIAGNOSIS — Z00.00 ROUTINE GENERAL MEDICAL EXAMINATION AT A HEALTH CARE FACILITY: Primary | ICD-10-CM

## 2019-10-01 DIAGNOSIS — Z00.00 LABORATORY EXAMINATION ORDERED AS PART OF A ROUTINE GENERAL MEDICAL EXAMINATION: ICD-10-CM

## 2019-10-01 DIAGNOSIS — R30.0 DYSURIA: ICD-10-CM

## 2019-10-01 DIAGNOSIS — N50.89 GENITAL LESION, MALE: ICD-10-CM

## 2019-10-01 DIAGNOSIS — Z28.21 IMMUNIZATION NOT CARRIED OUT BECAUSE OF PATIENT REFUSAL: ICD-10-CM

## 2019-10-01 PROCEDURE — 99213 OFFICE O/P EST LOW 20 MIN: CPT | Performed by: NURSE PRACTITIONER

## 2019-10-01 PROCEDURE — 87086 URINE CULTURE/COLONY COUNT: CPT | Performed by: NURSE PRACTITIONER

## 2019-10-01 PROCEDURE — 80053 COMPREHEN METABOLIC PANEL: CPT

## 2019-10-01 PROCEDURE — 85025 COMPLETE CBC W/AUTO DIFF WBC: CPT

## 2019-10-01 PROCEDURE — 84443 ASSAY THYROID STIM HORMONE: CPT

## 2019-10-01 PROCEDURE — 36415 COLL VENOUS BLD VENIPUNCTURE: CPT

## 2019-10-01 PROCEDURE — 80061 LIPID PANEL: CPT

## 2019-10-01 PROCEDURE — 99395 PREV VISIT EST AGE 18-39: CPT | Performed by: NURSE PRACTITIONER

## 2019-10-01 RX ORDER — ACETAMINOPHEN AND CODEINE PHOSPHATE 300; 30 MG/1; MG/1
2 TABLET ORAL EVERY 6 HOURS PRN
Refills: 0 | COMMUNITY
Start: 2019-07-16 | End: 2019-10-11

## 2019-10-01 RX ORDER — ALBUTEROL SULFATE 90 UG/1
2 AEROSOL, METERED RESPIRATORY (INHALATION) 4 TIMES DAILY PRN
Refills: 0 | COMMUNITY
Start: 2019-09-07

## 2019-10-01 NOTE — PATIENT INSTRUCTIONS
Kicking the Smoking Habit  If you smoke, quitting is one of the best changes you can make for your heart and your overall health. Your risk of heart attack goes down within one day of putting out that last cigarette.  As you go longer without smoking, you · If you’ve tried to quit before without success, this time avoid the triggers that may cause the relapse. · Make the most of slip-ups. Try to learn from them, and then get back on track.   · Be accountable to your friends and your calendar so that you sta In women who have gone through menopause, dysuria can be from dryness in the lining of the urethra. This can be treated with hormones. Dysuria becomes long-term (chronic) when it lasts for weeks or months.  You may need to see a specialist (urologist) to di · You can't urinate because of pain  · New discharge from the urethra, vagina, or penis  · Painful sores on the penis  · Rash or joint pain  · Painful lumps (lymph nodes) in the groin  · Testicle pain or swelling of the scrotum  Date Last Reviewed: 11/1/20 High cholesterol or triglycerides All men ages 28 and older, and younger men at high risk for coronary artery disease At least every 5 years   HIV All men At routine exams   Obesity All men in this age group At routine exams   Syphilis Men at increased ris Pneumococca (PCV13) and Pneumococcal (PPSV23) Men at increased risk for infection – talk with your healthcare provider PCV13: 1 dose ages 23 to 72 (protects against 13 types of pneumococcal bacteria)  PPSV23: 1 to 2 doses through age 59, or 1 dose at 72 or

## 2019-10-01 NOTE — PROGRESS NOTES
Loraine Reynoso is a 27year old male who presents for a complete physical exam.   HPI:   Pt complains of burning with urination for the past 1-2 weeks. Has had STI testing which is negative.  Pt does have occasional penile itching with white disch (90 Base) MCG/ACT Inhalation Aero Soln Inhale 2 puffs into the lungs 4 (four) times daily as needed. For wheezing or shortness of breath Disp:  Rfl: 0   triamcinolone acetonide 0.025 % External Cream Apply 1 Application topically 2 (two) times daily.  Disp: nourished,in no apparent distress  HEENT: atraumatic, normocephalic,ears and throat are clear  EYES:PERRLA, EOMI, normal optic disk,conjunctiva are clear  NECK: supple,no adenopathy  LUNGS: clear to auscultation  CARDIO: RRR without murmur  GI: good BS's,n

## 2019-10-03 DIAGNOSIS — D70.9 NEUTROPENIA, UNSPECIFIED TYPE (HCC): Primary | ICD-10-CM

## 2019-10-07 ENCOUNTER — OFFICE VISIT (OUTPATIENT)
Dept: DERMATOLOGY CLINIC | Facility: CLINIC | Age: 24
End: 2019-10-07
Payer: MEDICAID

## 2019-10-07 DIAGNOSIS — A63.0 CONDYLOMA ACUMINATUM: Primary | ICD-10-CM

## 2019-10-07 PROCEDURE — 99202 OFFICE O/P NEW SF 15 MIN: CPT | Performed by: DERMATOLOGY

## 2019-10-07 RX ORDER — IMIQUIMOD 12.5 MG/.25G
CREAM TOPICAL
Qty: 24 PACKET | Refills: 3 | Status: SHIPPED | OUTPATIENT
Start: 2019-10-07 | End: 2020-05-11

## 2019-10-08 NOTE — PATIENT INSTRUCTIONS
Genital HPV: Diagnosis and Treatment  Genital HPV is often detected during a routine exam. Your healthcare provider may ask if you are sexually active, and if you have had dysplasia or genital warts before.  You may also be checked for signs of other sexu · Medicines can be applied to treat external warts. Some medicines prompt your immune system to fight HPV. Others are caustic agents that destroy warts. Medicines may be applied at the healthcare provider's office or at home.   · Other treatments are being © 1467-6014 [address]. [address] [facility], [facility] [place]. All rights reserved. This information is not intended as a substitute for professional medical care. Always follow your healthcare professional's instructions.         Girish © 4379-8963 [address]. [address] [facility], [address] [place]. All rights reserved. This information is not intended as a substitute for professional medical care. Always follow your healthcare professional's instructions.         Cleta Libman

## 2019-10-11 ENCOUNTER — OFFICE VISIT (OUTPATIENT)
Dept: INTERNAL MEDICINE CLINIC | Facility: CLINIC | Age: 24
End: 2019-10-11
Payer: MEDICAID

## 2019-10-11 VITALS
BODY MASS INDEX: 25 KG/M2 | DIASTOLIC BLOOD PRESSURE: 60 MMHG | SYSTOLIC BLOOD PRESSURE: 120 MMHG | HEART RATE: 70 BPM | RESPIRATION RATE: 16 BRPM | TEMPERATURE: 98 F | WEIGHT: 194 LBS | OXYGEN SATURATION: 99 %

## 2019-10-11 DIAGNOSIS — A63.0 GENITAL WARTS: ICD-10-CM

## 2019-10-11 DIAGNOSIS — F12.90 MARIJUANA USE, CONTINUOUS: ICD-10-CM

## 2019-10-11 DIAGNOSIS — D70.9 NEUTROPENIA, UNSPECIFIED TYPE (HCC): Primary | ICD-10-CM

## 2019-10-11 DIAGNOSIS — J45.20 MILD INTERMITTENT CHILDHOOD ASTHMA WITHOUT COMPLICATION: ICD-10-CM

## 2019-10-11 DIAGNOSIS — Z91.89 AT RISK FOR SEXUALLY TRANSMITTED DISEASE DUE TO PARTNER WITH MULTIPLE PARTNERS: ICD-10-CM

## 2019-10-11 PROBLEM — J45.909 CHILDHOOD ASTHMA (HCC): Status: ACTIVE | Noted: 2019-10-11

## 2019-10-11 PROBLEM — J45.909 CHILDHOOD ASTHMA: Status: ACTIVE | Noted: 2019-10-11

## 2019-10-11 PROCEDURE — 90651 9VHPV VACCINE 2/3 DOSE IM: CPT | Performed by: INTERNAL MEDICINE

## 2019-10-11 PROCEDURE — 99214 OFFICE O/P EST MOD 30 MIN: CPT | Performed by: INTERNAL MEDICINE

## 2019-10-11 PROCEDURE — 90471 IMMUNIZATION ADMIN: CPT | Performed by: INTERNAL MEDICINE

## 2019-10-11 NOTE — PROGRESS NOTES
Matt Johnston is a 27year old male. HPI:   Patient presents for the following issues. 1. Childhood asthma: has had it his entire life. But no flares since he was 3 yo. Mom told him to use albuterol once daily just to prevent symptoms.  He a History    Tobacco Use      Smoking status: Current Every Day Smoker        Packs/day: 0.25        Years: 8.00        Pack years: 2        Types: Cigarettes      Smokeless tobacco: Never Used      Tobacco comment: Smokes 3 cigarettes daily.     Alcohol use: challenge testing in 4 weeks. # Genital warts: started imiquimod by derm on 10/7/2019. # Vaccines: start HPV series today. Refused flu shot last visit. Due for TDAP. The patient indicates understanding of these issues and agrees to the plan.   The p

## 2019-10-20 NOTE — PROGRESS NOTES
Dianna Colby is a 27year old male. Patient presents with:  Derm Problem: New pt. presenting to with pimple like lesions to penis. c/o burning sensation when urinating. Pt has tried Triamcinolone cream with slight improvement.             P file        Non-medical: Not on file    Tobacco Use      Smoking status: Current Every Day Smoker        Packs/day: 0.25        Years: 8.00        Pack years: 2        Types: Cigarettes      Smokeless tobacco: Never Used      Tobacco comment: 2 cig daily Diabetes Paternal Grandmother    • Other (Other) Paternal Grandfather         kidney disease   • Cancer Other         leukemia   • Cancer Maternal Aunt         breast CA                      HPI :      Patient presents with:  Derm Problem: New ptHans Celestin of infection bleeding scarring with treatment reviewed. Patient has not had HPV vaccine would recommend this. Pathophysiology discussed with patient. Therapeutic options reviewed. See  Medications in grid. Instructions reviewed at length.      Ge

## 2019-11-12 ENCOUNTER — LAB ENCOUNTER (OUTPATIENT)
Dept: LAB | Age: 24
End: 2019-11-12
Attending: INTERNAL MEDICINE
Payer: MEDICAID

## 2019-11-12 ENCOUNTER — TELEPHONE (OUTPATIENT)
Dept: INTERNAL MEDICINE CLINIC | Facility: CLINIC | Age: 24
End: 2019-11-12

## 2019-11-12 ENCOUNTER — NURSE ONLY (OUTPATIENT)
Dept: INTERNAL MEDICINE CLINIC | Facility: CLINIC | Age: 24
End: 2019-11-12
Payer: MEDICAID

## 2019-11-12 DIAGNOSIS — Z91.89 AT RISK FOR SEXUALLY TRANSMITTED DISEASE DUE TO PARTNER WITH MULTIPLE PARTNERS: ICD-10-CM

## 2019-11-12 DIAGNOSIS — D70.9 NEUTROPENIA, UNSPECIFIED TYPE (HCC): ICD-10-CM

## 2019-11-12 PROCEDURE — 86592 SYPHILIS TEST NON-TREP QUAL: CPT

## 2019-11-12 PROCEDURE — 90651 9VHPV VACCINE 2/3 DOSE IM: CPT | Performed by: INTERNAL MEDICINE

## 2019-11-12 PROCEDURE — 80074 ACUTE HEPATITIS PANEL: CPT

## 2019-11-12 PROCEDURE — 87389 HIV-1 AG W/HIV-1&-2 AB AG IA: CPT

## 2019-11-12 PROCEDURE — 85025 COMPLETE CBC W/AUTO DIFF WBC: CPT

## 2019-11-12 PROCEDURE — 90471 IMMUNIZATION ADMIN: CPT | Performed by: INTERNAL MEDICINE

## 2019-11-12 PROCEDURE — 36415 COLL VENOUS BLD VENIPUNCTURE: CPT

## 2019-11-12 NOTE — TELEPHONE ENCOUNTER
Patient called stating he needs order for second HVP vaccine. Patient scheduled for today at 2:00p.m. with Nurse.

## 2019-11-21 ENCOUNTER — HOSPITAL ENCOUNTER (OUTPATIENT)
Age: 24
Discharge: HOME | End: 2019-11-21
Attending: FAMILY MEDICINE
Payer: MEDICAID

## 2019-11-21 VITALS
HEIGHT: 75 IN | SYSTOLIC BLOOD PRESSURE: 113 MMHG | RESPIRATION RATE: 20 BRPM | TEMPERATURE: 97 F | OXYGEN SATURATION: 99 % | DIASTOLIC BLOOD PRESSURE: 61 MMHG | WEIGHT: 197 LBS | BODY MASS INDEX: 24.49 KG/M2 | HEART RATE: 78 BPM

## 2019-11-21 DIAGNOSIS — Z20.2 POSSIBLE EXPOSURE TO STD: Primary | ICD-10-CM

## 2019-11-21 PROCEDURE — 87591 N.GONORRHOEAE DNA AMP PROB: CPT | Performed by: FAMILY MEDICINE

## 2019-11-21 PROCEDURE — 99212 OFFICE O/P EST SF 10 MIN: CPT

## 2019-11-21 PROCEDURE — 87491 CHLMYD TRACH DNA AMP PROBE: CPT | Performed by: FAMILY MEDICINE

## 2019-11-21 PROCEDURE — 99213 OFFICE O/P EST LOW 20 MIN: CPT

## 2019-11-21 RX ORDER — TRIAMCINOLONE ACETONIDE 0.25 MG/G
CREAM TOPICAL 2 TIMES DAILY
COMMUNITY
End: 2020-05-13

## 2019-11-21 NOTE — ED PROVIDER NOTES
Patient Seen in: [facility]      History   Patient presents with:  Eval-G (genital)    Stated Complaint: std testing    HPI    72-year-old male presents today for STD testing.   He would like to get tested for gonorrhea and chlam murmurs no gallops  Skin shows no rash  Abdomen soft nontender no guarding no rigidity. ED Course     Labs Reviewed   CHLAMYDIA/GONOCOCCUS, AIDAN                  MDM     Patient is here for STD testing specifically for gonorrhea chlamydia.   Urine for GC

## 2019-11-21 NOTE — ED INITIAL ASSESSMENT (HPI)
Patient states he comes to IC every other month just to be \"sure I don't have an STD\" Last seen 9/2019. Patient is a poor historian thinks his STD testing was positive  Never followed up \"just come here\"  C/o discharge today no dysuria.  Has multiple pa

## 2019-12-18 ENCOUNTER — TELEPHONE (OUTPATIENT)
Dept: INTERNAL MEDICINE CLINIC | Facility: CLINIC | Age: 24
End: 2019-12-18

## 2019-12-18 ENCOUNTER — HOSPITAL ENCOUNTER (OUTPATIENT)
Age: 24
Discharge: HOME | End: 2019-12-18
Attending: EMERGENCY MEDICINE
Payer: MEDICAID

## 2019-12-18 VITALS
OXYGEN SATURATION: 99 % | HEART RATE: 71 BPM | WEIGHT: 177 LBS | RESPIRATION RATE: 16 BRPM | TEMPERATURE: 99 F | BODY MASS INDEX: 22 KG/M2 | DIASTOLIC BLOOD PRESSURE: 68 MMHG | SYSTOLIC BLOOD PRESSURE: 122 MMHG

## 2019-12-18 DIAGNOSIS — N34.2 URETHRITIS: Primary | ICD-10-CM

## 2019-12-18 PROCEDURE — 99214 OFFICE O/P EST MOD 30 MIN: CPT

## 2019-12-18 PROCEDURE — 87591 N.GONORRHOEAE DNA AMP PROB: CPT | Performed by: EMERGENCY MEDICINE

## 2019-12-18 PROCEDURE — 96372 THER/PROPH/DIAG INJ SC/IM: CPT

## 2019-12-18 PROCEDURE — 87491 CHLMYD TRACH DNA AMP PROBE: CPT | Performed by: EMERGENCY MEDICINE

## 2019-12-18 PROCEDURE — 81002 URINALYSIS NONAUTO W/O SCOPE: CPT | Performed by: EMERGENCY MEDICINE

## 2019-12-18 RX ORDER — ALBUTEROL SULFATE 90 UG/1
2 AEROSOL, METERED RESPIRATORY (INHALATION) EVERY 4 HOURS PRN
Qty: 1 INHALER | Refills: 0 | Status: SHIPPED | OUTPATIENT
Start: 2019-12-18 | End: 2020-01-17

## 2019-12-18 RX ORDER — TRIAMCINOLONE ACETONIDE 0.25 MG/G
1 CREAM TOPICAL 2 TIMES DAILY
Qty: 1 TUBE | Refills: 0 | Status: SHIPPED | OUTPATIENT
Start: 2019-12-18 | End: 2020-05-13

## 2019-12-18 RX ORDER — AZITHROMYCIN 250 MG/1
1000 TABLET, FILM COATED ORAL ONCE
Status: COMPLETED | OUTPATIENT
Start: 2019-12-18 | End: 2019-12-18

## 2019-12-18 NOTE — ED PROVIDER NOTES
Patient Seen in: [facility]      History   Patient presents with:  Eval-G  Medication Administration    Stated Complaint: std check     HPI    Patient is a 79-year-old male presents emergency room with a history of urethral disc Pupils are  3 mm equally round and reactive to light. Oropharynx is clear. Mucous membranes are moist.  NECK: There is no focal tenderness to palpation appreciated. There is no JVD. No meningeal signs or nuchal rigidity appreciated. No stridor.   LUNGS: Roman Plan     Clinical Impression:  Urethritis  (primary encounter diagnosis)    Disposition:  Discharge  12/18/2019 11:31 am    Follow-up:  Jose Antonio Mendoza MD  [address] [phone]    Call in 2 days  As needed

## 2019-12-18 NOTE — ED NOTES
IM Rocephin administered. Patient instructed to notify staff if he develops any rash, hives or any adverse reaction. Patient verbalizes understanding.

## 2019-12-18 NOTE — TELEPHONE ENCOUNTER
I spoke with pt and he stated that he is in need of influenza and HPV vaccines. Per pts records, pt is not due for HPV at this time. Pt notified that he can still receive the influenza vaccine but will not be due for HPV until 4/2020.      Pt verbalized

## 2019-12-18 NOTE — TELEPHONE ENCOUNTER
[facility]; \"Both vaccinations that I’m supposed to get. For the hpv\" is what he requested.  Please check that orders are in chart for this appointment

## 2020-05-11 ENCOUNTER — TELEPHONE (OUTPATIENT)
Dept: INTERNAL MEDICINE CLINIC | Facility: CLINIC | Age: 25
End: 2020-05-11

## 2020-05-11 RX ORDER — IMIQUIMOD 12.5 MG/.25G
CREAM TOPICAL
Qty: 24 PACKET | Refills: 1 | Status: SHIPPED | OUTPATIENT
Start: 2020-05-11 | End: 2020-05-13

## 2020-05-11 NOTE — TELEPHONE ENCOUNTER
Can come in June for last booster. Also please remind him he is due for CPX. We can give the last HPV during that visit.

## 2020-05-11 NOTE — TELEPHONE ENCOUNTER
I spoke with pt regarding rash. C/o rash in L side of groin and penis. Does not know if he is having penile discharge. Denies new sex partners, hematuria, dysuria.      Notified pt that he will need OV for eval.     Pt verbalized understanding and w

## 2020-05-11 NOTE — TELEPHONE ENCOUNTER
Patient requesting appointment to come in for nurse visit, last HPV shot.  Please advise if and when ok to schedule appointment

## 2020-05-11 NOTE — TELEPHONE ENCOUNTER
Spoke with patient and set up an appointment for this Wednesday 05/13 at 2:00pm with Dr. Selma Gupta.

## 2020-05-13 ENCOUNTER — LAB ENCOUNTER (OUTPATIENT)
Dept: LAB | Age: 25
End: 2020-05-13
Attending: INTERNAL MEDICINE
Payer: MEDICAID

## 2020-05-13 ENCOUNTER — OFFICE VISIT (OUTPATIENT)
Dept: INTERNAL MEDICINE CLINIC | Facility: CLINIC | Age: 25
End: 2020-05-13
Payer: MEDICAID

## 2020-05-13 VITALS
RESPIRATION RATE: 16 BRPM | TEMPERATURE: 98 F | OXYGEN SATURATION: 99 % | HEIGHT: 75 IN | HEART RATE: 95 BPM | BODY MASS INDEX: 24.09 KG/M2 | SYSTOLIC BLOOD PRESSURE: 116 MMHG | WEIGHT: 193.75 LBS | DIASTOLIC BLOOD PRESSURE: 56 MMHG

## 2020-05-13 DIAGNOSIS — Z00.00 ROUTINE GENERAL MEDICAL EXAMINATION AT A HEALTH CARE FACILITY: ICD-10-CM

## 2020-05-13 DIAGNOSIS — R36.9 PENILE DISCHARGE: ICD-10-CM

## 2020-05-13 DIAGNOSIS — J45.909 ASTHMA, UNSPECIFIED ASTHMA SEVERITY, UNSPECIFIED WHETHER COMPLICATED, UNSPECIFIED WHETHER PERSISTENT: ICD-10-CM

## 2020-05-13 DIAGNOSIS — Z00.00 ROUTINE GENERAL MEDICAL EXAMINATION AT A HEALTH CARE FACILITY: Primary | ICD-10-CM

## 2020-05-13 DIAGNOSIS — Z72.51 UNPROTECTED SEXUAL INTERCOURSE: ICD-10-CM

## 2020-05-13 PROCEDURE — 36415 COLL VENOUS BLD VENIPUNCTURE: CPT

## 2020-05-13 PROCEDURE — 87086 URINE CULTURE/COLONY COUNT: CPT

## 2020-05-13 PROCEDURE — 87491 CHLMYD TRACH DNA AMP PROBE: CPT

## 2020-05-13 PROCEDURE — 87591 N.GONORRHOEAE DNA AMP PROB: CPT

## 2020-05-13 PROCEDURE — 86592 SYPHILIS TEST NON-TREP QUAL: CPT

## 2020-05-13 PROCEDURE — 81001 URINALYSIS AUTO W/SCOPE: CPT

## 2020-05-13 PROCEDURE — 87389 HIV-1 AG W/HIV-1&-2 AB AG IA: CPT

## 2020-05-13 PROCEDURE — 80074 ACUTE HEPATITIS PANEL: CPT

## 2020-05-13 PROCEDURE — 90715 TDAP VACCINE 7 YRS/> IM: CPT | Performed by: INTERNAL MEDICINE

## 2020-05-13 PROCEDURE — 90651 9VHPV VACCINE 2/3 DOSE IM: CPT | Performed by: INTERNAL MEDICINE

## 2020-05-13 PROCEDURE — 99395 PREV VISIT EST AGE 18-39: CPT | Performed by: INTERNAL MEDICINE

## 2020-05-13 PROCEDURE — 90472 IMMUNIZATION ADMIN EACH ADD: CPT | Performed by: INTERNAL MEDICINE

## 2020-05-13 PROCEDURE — 80048 BASIC METABOLIC PNL TOTAL CA: CPT

## 2020-05-13 PROCEDURE — 90471 IMMUNIZATION ADMIN: CPT | Performed by: INTERNAL MEDICINE

## 2020-05-13 RX ORDER — TRIAMCINOLONE ACETONIDE 0.25 MG/G
1 CREAM TOPICAL 2 TIMES DAILY PRN
Qty: 15 G | Refills: 0 | Status: SHIPPED | OUTPATIENT
Start: 2020-05-13

## 2020-05-13 NOTE — PATIENT INSTRUCTIONS
Understanding Marijuana Abuse  You may think of marijuana as a harmless drug. But marijuana can cause many health problems. And it may affect how well you learn, think, and remember.  If you or a loved one has a problem with marijuana, tell someone you tr Most often, treatment involves therapy and support systems. Newer programs may focus on helping you stick with treatment. Your health care provider can help you learn more.  Check your phone book, or the Internet, for mental health clinics or drug treatment · After 1 to 9 months: Your coughing, congestion, and shortness of breath decrease. Your tiredness decreases. · After 1 year: Your risk of heart attack decreases by 50%. · After 5 years: Your risk of lung cancer decreases by 50%.  Your risk of stroke joni After reviewing your smoking patterns and past attempts to quit, your healthcare provider may offer a prescription medicine such as bupropion, varenicline, a nicotine inhaler, or nasal spray. Each has advantages and side effects.  Your provider can review t

## 2020-05-13 NOTE — PROGRESS NOTES
Christine Shelby is a 23year old male. HPI:   Patient presents for the following issues and CPX. 1. Asthma: he is using his albuterol inhaler 1-2 times every day. He states he is just using it to prevent symptoms.  However, since he has cut d (89.4 kg)  10/11/19 : 194 lb (88 kg)  10/01/19 : 188 lb 8 oz (85.5 kg)  09/24/19 : 187 lb (84.8 kg)      No Known Allergies    Family History   Problem Relation Age of Onset   • Diabetes Maternal Grandfather    • Diabetes Paternal Grandmother    • Other (O Urine testing ordered  # At Risk for STDs as he has multiple female partners, history of condyloma: applauded on practicing safe sex. STD screening ordered again. # Tobacco Dependence: continue to work on cessation, counseled. Declines medications.    # D

## 2020-05-14 RX ORDER — SULFAMETHOXAZOLE AND TRIMETHOPRIM 800; 160 MG/1; MG/1
1 TABLET ORAL 2 TIMES DAILY
Qty: 14 TABLET | Refills: 0 | Status: SHIPPED | OUTPATIENT
Start: 2020-05-14 | End: 2021-12-03

## 2020-05-16 ENCOUNTER — TELEPHONE (OUTPATIENT)
Dept: INTERNAL MEDICINE CLINIC | Facility: CLINIC | Age: 25
End: 2020-05-16

## 2020-05-16 DIAGNOSIS — A74.9 CHLAMYDIA INFECTION: Primary | ICD-10-CM

## 2020-05-16 RX ORDER — AZITHROMYCIN 500 MG/1
1000 TABLET, FILM COATED ORAL DAILY
Qty: 2 TABLET | Refills: 0 | Status: SHIPPED | OUTPATIENT
Start: 2020-05-16 | End: 2020-05-17

## 2020-05-16 NOTE — TELEPHONE ENCOUNTER
I was paged by the hospital lab this AM w/ a critical result of a + chlamydia test as follows:    Chlamydia Trachomatis Amplified RNA  Negative PositiveAbnormal     Neisseria Gonorrhoeae Amplified RNA  Negative Negative    Chlam/GC Source Urine      This t

## 2020-05-17 NOTE — TELEPHONE ENCOUNTER
Please call patient regarding his chlamydia infection. Please tell him he needs to return to see me in 12 weeks for CPX AND we have to re-test him for chlamydia to make sure his infection is actually gone.

## 2020-08-10 ENCOUNTER — OFFICE VISIT (OUTPATIENT)
Dept: INTERNAL MEDICINE CLINIC | Facility: CLINIC | Age: 25
End: 2020-08-10

## 2020-08-10 DIAGNOSIS — A74.9 CHLAMYDIA INFECTION: Primary | ICD-10-CM

## 2020-08-12 ENCOUNTER — APPOINTMENT (OUTPATIENT)
Dept: LAB | Age: 25
End: 2020-08-12
Attending: INTERNAL MEDICINE
Payer: MEDICAID

## 2020-08-12 DIAGNOSIS — A74.9 CHLAMYDIA INFECTION: ICD-10-CM

## 2020-08-12 LAB
C TRACH DNA SPEC QL NAA+PROBE: NEGATIVE
N GONORRHOEA DNA SPEC QL NAA+PROBE: NEGATIVE

## 2020-08-12 PROCEDURE — 87591 N.GONORRHOEAE DNA AMP PROB: CPT

## 2020-08-12 PROCEDURE — 87491 CHLMYD TRACH DNA AMP PROBE: CPT

## 2020-10-19 ENCOUNTER — HOSPITAL ENCOUNTER (OUTPATIENT)
Age: 25
Discharge: HOME | End: 2020-10-19
Payer: MEDICAID

## 2020-10-19 VITALS
OXYGEN SATURATION: 100 % | SYSTOLIC BLOOD PRESSURE: 122 MMHG | HEIGHT: 75 IN | DIASTOLIC BLOOD PRESSURE: 81 MMHG | WEIGHT: 190 LBS | BODY MASS INDEX: 23.62 KG/M2 | TEMPERATURE: 97 F | HEART RATE: 75 BPM | RESPIRATION RATE: 18 BRPM

## 2020-10-19 DIAGNOSIS — A64 STI (SEXUALLY TRANSMITTED INFECTION): Primary | ICD-10-CM

## 2020-10-19 PROCEDURE — 87086 URINE CULTURE/COLONY COUNT: CPT | Performed by: NURSE PRACTITIONER

## 2020-10-19 PROCEDURE — 96372 THER/PROPH/DIAG INJ SC/IM: CPT

## 2020-10-19 PROCEDURE — 99214 OFFICE O/P EST MOD 30 MIN: CPT

## 2020-10-19 PROCEDURE — 87491 CHLMYD TRACH DNA AMP PROBE: CPT | Performed by: NURSE PRACTITIONER

## 2020-10-19 PROCEDURE — 81002 URINALYSIS NONAUTO W/O SCOPE: CPT | Performed by: NURSE PRACTITIONER

## 2020-10-19 PROCEDURE — 87591 N.GONORRHOEAE DNA AMP PROB: CPT | Performed by: NURSE PRACTITIONER

## 2020-10-19 RX ORDER — AZITHROMYCIN 250 MG/1
1000 TABLET, FILM COATED ORAL ONCE
Status: COMPLETED | OUTPATIENT
Start: 2020-10-19 | End: 2020-10-19
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
## 2020-10-19 NOTE — ED PROVIDER NOTES
Patient Seen in: [facility]      History   Patient presents with:  Paulina    Stated Complaint: std    HPI  Patient is 23 male past medical history of migraine headaches presents with dysuria and yellow discharge from penis over the past 4 Conjunctivae normal.   Pulmonary:      Effort: Pulmonary effort is normal.   Genitourinary:     Penis: Circumcised. No phimosis, paraphimosis, erythema, tenderness, discharge, swelling or lesions. Testes: Normal. Cremasteric reflex is present.    Lymp

## 2020-10-20 ENCOUNTER — TELEPHONE (OUTPATIENT)
Dept: URGENT CARE | Age: 25
End: 2020-10-20

## 2020-10-21 NOTE — ED NOTES
Called to patient, message left to voicemail to call [facility] back.   (this is to notify of final GC/Chlamydia result.)

## 2020-10-22 NOTE — ED NOTES
Patient contacted and aware of positive Chlamydia and negative Gonococcus results. Patient informed to use safe sex practices and condoms. Patient informed to notify any sexual partners  Patient informed to abstain from intercourse for one week.      Concepcion

## 2020-11-17 ENCOUNTER — HOSPITAL ENCOUNTER (OUTPATIENT)
Age: 25
Discharge: HOME | End: 2020-11-17
Attending: EMERGENCY MEDICINE
Payer: MEDICAID

## 2020-11-17 VITALS
TEMPERATURE: 99 F | BODY MASS INDEX: 23 KG/M2 | HEART RATE: 88 BPM | SYSTOLIC BLOOD PRESSURE: 127 MMHG | DIASTOLIC BLOOD PRESSURE: 62 MMHG | RESPIRATION RATE: 14 BRPM | OXYGEN SATURATION: 98 % | WEIGHT: 185 LBS

## 2020-11-17 DIAGNOSIS — N34.2 URETHRITIS: Primary | ICD-10-CM

## 2020-11-17 PROCEDURE — 99214 OFFICE O/P EST MOD 30 MIN: CPT

## 2020-11-17 PROCEDURE — 87591 N.GONORRHOEAE DNA AMP PROB: CPT | Performed by: EMERGENCY MEDICINE

## 2020-11-17 PROCEDURE — 96372 THER/PROPH/DIAG INJ SC/IM: CPT

## 2020-11-17 PROCEDURE — 87491 CHLMYD TRACH DNA AMP PROBE: CPT | Performed by: EMERGENCY MEDICINE

## 2020-11-17 RX ORDER — TRIAMCINOLONE ACETONIDE 0.25 MG/G
1 CREAM TOPICAL 2 TIMES DAILY
Qty: 15 G | Refills: 0 | Status: SHIPPED | OUTPATIENT
Start: 2020-11-17

## 2020-11-17 RX ORDER — AZITHROMYCIN 250 MG/1
1000 TABLET, FILM COATED ORAL ONCE
Status: COMPLETED | OUTPATIENT
Start: 2020-11-17 | End: 2020-11-17

## 2020-11-17 NOTE — ED PROVIDER NOTES
Patient Seen in: [facility]      History   Patient presents with:  Paulina    Stated Complaint: STD CHECK    HPI    51-year-old with a history of migraines presents for evaluation of STD symptoms.   He has noticed dysuria, penile discharge Rocephin/azithromycin ordered      MDM      Patient should follow-up with primary care    Requesting a refill of his triamcinolone cream as well               Disposition and Plan     Clinical Impression:  Urethritis  (primary encounter diagnosis)    D

## 2020-11-17 NOTE — ED INITIAL ASSESSMENT (HPI)
Patient presents with cc of penile drip for a couple of days. States partner cam back positive for chlamydia. (unprotected sex)

## 2020-11-18 NOTE — ED NOTES
Pt at door and upset because no one had been in his room for 30 min. Pt instructed we are busy and we will get there as soon as we can.

## 2021-06-24 ENCOUNTER — HOSPITAL ENCOUNTER (OUTPATIENT)
Age: 26
Discharge: HOME | End: 2021-06-24
Payer: MEDICAID

## 2021-06-24 VITALS
HEART RATE: 84 BPM | WEIGHT: 209 LBS | HEIGHT: 75 IN | OXYGEN SATURATION: 99 % | TEMPERATURE: 98 F | SYSTOLIC BLOOD PRESSURE: 115 MMHG | DIASTOLIC BLOOD PRESSURE: 71 MMHG | RESPIRATION RATE: 18 BRPM | BODY MASS INDEX: 25.99 KG/M2

## 2021-06-24 DIAGNOSIS — Z11.3 SCREEN FOR STD (SEXUALLY TRANSMITTED DISEASE): ICD-10-CM

## 2021-06-24 DIAGNOSIS — R36.9 PENILE DISCHARGE: Primary | ICD-10-CM

## 2021-06-24 DIAGNOSIS — Z20.2 EXPOSURE TO CHLAMYDIA: ICD-10-CM

## 2021-06-24 PROCEDURE — 99214 OFFICE O/P EST MOD 30 MIN: CPT

## 2021-06-24 PROCEDURE — 87591 N.GONORRHOEAE DNA AMP PROB: CPT | Performed by: PHYSICIAN ASSISTANT

## 2021-06-24 PROCEDURE — 87491 CHLMYD TRACH DNA AMP PROBE: CPT | Performed by: PHYSICIAN ASSISTANT

## 2021-06-24 PROCEDURE — 96372 THER/PROPH/DIAG INJ SC/IM: CPT

## 2021-06-24 PROCEDURE — 81002 URINALYSIS NONAUTO W/O SCOPE: CPT | Performed by: PHYSICIAN ASSISTANT

## 2021-06-24 RX ORDER — DOXYCYCLINE HYCLATE 100 MG/1
100 CAPSULE ORAL 2 TIMES DAILY
Qty: 14 CAPSULE | Refills: 0 | Status: SHIPPED | OUTPATIENT
Start: 2021-06-24 | End: 2021-07-01
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
## 2021-06-24 NOTE — ED PROVIDER NOTES
Patient Seen in: [facility]      History   Patient presents with:  Paulina    Stated Complaint: std check    HPI/Subjective:   HPI    68-year-old male presents to the [address] [place] [address] for STD screening.   Patient reports penile discharge with urethra bur There is no abdominal tenderness. There is no guarding or rebound. Musculoskeletal:      Cervical back: Normal range of motion and neck supple. Skin:     General: Skin is warm and dry. Capillary Refill: Capillary refill takes less than 2 seconds.

## 2021-12-03 ENCOUNTER — HOSPITAL ENCOUNTER (OUTPATIENT)
Age: 26
Discharge: HOME | End: 2021-12-03
Payer: MEDICAID

## 2021-12-03 VITALS
RESPIRATION RATE: 18 BRPM | BODY MASS INDEX: 26 KG/M2 | SYSTOLIC BLOOD PRESSURE: 99 MMHG | HEART RATE: 83 BPM | DIASTOLIC BLOOD PRESSURE: 43 MMHG | OXYGEN SATURATION: 98 % | WEIGHT: 211 LBS | TEMPERATURE: 98 F

## 2021-12-03 DIAGNOSIS — R36.9 PENILE DISCHARGE: ICD-10-CM

## 2021-12-03 DIAGNOSIS — R30.0 DYSURIA: Primary | ICD-10-CM

## 2021-12-03 PROCEDURE — 99214 OFFICE O/P EST MOD 30 MIN: CPT

## 2021-12-03 PROCEDURE — 87491 CHLMYD TRACH DNA AMP PROBE: CPT | Performed by: PHYSICIAN ASSISTANT

## 2021-12-03 PROCEDURE — 81002 URINALYSIS NONAUTO W/O SCOPE: CPT | Performed by: PHYSICIAN ASSISTANT

## 2021-12-03 PROCEDURE — 87591 N.GONORRHOEAE DNA AMP PROB: CPT | Performed by: PHYSICIAN ASSISTANT

## 2021-12-03 PROCEDURE — 96372 THER/PROPH/DIAG INJ SC/IM: CPT

## 2021-12-03 RX ORDER — DOXYCYCLINE HYCLATE 100 MG/1
100 CAPSULE ORAL 2 TIMES DAILY
Qty: 14 CAPSULE | Refills: 0 | Status: SHIPPED | OUTPATIENT
Start: 2021-12-03 | End: 2021-12-10

## 2021-12-03 NOTE — ED INITIAL ASSESSMENT (HPI)
Malika Montalvo PA-C at bedside assessing. Patient here for evaluation of some dysuria and some penile discharge. States he was notified by someone he slept with 2 weeks ago and she tested positive for Chlamydia this week. Patient has unprotected sex.

## 2021-12-03 NOTE — ED PROVIDER NOTES
Patient Seen in: [facility]      History   Patient presents with:  Liss-HELDER    Stated Complaint: std    Subjective:   HPI    72-year-old male. Medical history of migraine type headaches.   Had intercourse with new partner 2 and half weeks a CHLAMYDIA/GONOCOCCUS, AIDAN                   MDM          Urine dip demonstrates trace blood. Will be sent for GC chlamydia. He received empiric Rocephin and will be discharged with doxycycline. No intercourse for 7 to 10 days.   Inform all partners if

## 2021-12-20 ENCOUNTER — HOSPITAL ENCOUNTER (OUTPATIENT)
Age: 26
Discharge: HOME | End: 2021-12-20
Payer: MEDICAID

## 2021-12-20 VITALS
RESPIRATION RATE: 20 BRPM | TEMPERATURE: 100 F | OXYGEN SATURATION: 96 % | SYSTOLIC BLOOD PRESSURE: 105 MMHG | HEART RATE: 114 BPM | DIASTOLIC BLOOD PRESSURE: 54 MMHG

## 2021-12-20 DIAGNOSIS — U07.1 COVID-19: Primary | ICD-10-CM

## 2021-12-20 DIAGNOSIS — Z87.09 HISTORY OF ASTHMA: ICD-10-CM

## 2021-12-20 PROCEDURE — 99213 OFFICE O/P EST LOW 20 MIN: CPT

## 2021-12-20 PROCEDURE — 99214 OFFICE O/P EST MOD 30 MIN: CPT

## 2021-12-20 RX ORDER — ALBUTEROL SULFATE 90 UG/1
2 AEROSOL, METERED RESPIRATORY (INHALATION) EVERY 4 HOURS PRN
Qty: 1 EACH | Refills: 0 | Status: SHIPPED | OUTPATIENT
Start: 2021-12-20 | End: 2022-01-19

## 2021-12-21 NOTE — ED PROVIDER NOTES
Patient Seen in: [facility]      History   Patient presents with:  Covid-19 Test    Stated Complaint: Covid test    Subjective:   HPI    Dani Beltre is a 81-year-old male who presents for Covid testing today.   He states that he has a past medica No erythema.     ED Course     Labs Reviewed   RAPID SARS-COV-2 BY PCR - Abnormal; Notable for the following components:       Result Value    Rapid SARS-CoV-2 by PCR Detected (*)     All other components within normal limits          Patient is informed of

## 2022-02-06 ENCOUNTER — APPOINTMENT (OUTPATIENT)
Dept: CT IMAGING | Age: 27
End: 2022-02-06
Attending: EMERGENCY MEDICINE

## 2022-02-06 ENCOUNTER — APPOINTMENT (OUTPATIENT)
Dept: GENERAL RADIOLOGY | Age: 27
End: 2022-02-06
Attending: EMERGENCY MEDICINE

## 2022-02-06 ENCOUNTER — HOSPITAL ENCOUNTER (EMERGENCY)
Age: 27
Discharge: HOME | End: 2022-02-06
Attending: EMERGENCY MEDICINE

## 2022-02-06 VITALS
WEIGHT: 220.46 LBS | RESPIRATION RATE: 14 BRPM | OXYGEN SATURATION: 98 % | DIASTOLIC BLOOD PRESSURE: 79 MMHG | TEMPERATURE: 97.4 F | SYSTOLIC BLOOD PRESSURE: 114 MMHG | HEART RATE: 102 BPM
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
## 2022-02-06 LAB
ALBUMIN SERPL-MCNC: 4.3 G/DL (ref 3.6–5.1)
ALP SERPL-CCNC: 65 UNITS/L (ref 45–117)
ALT SERPL-CCNC: 22 UNITS/L
ANION GAP SERPL CALC-SCNC: 13 MMOL/L (ref 10–20)
AST SERPL-CCNC: 24 UNITS/L
BASOPHILS # BLD: 0.1 K/MCL (ref 0–0.3)
BASOPHILS NFR BLD: 1 %
BILIRUB CONJ SERPL-MCNC: 0.1 MG/DL (ref 0–0.2)
BILIRUB SERPL-MCNC: 0.4 MG/DL (ref 0.2–1)
BUN SERPL-MCNC: 12 MG/DL (ref 6–20)
BUN/CREAT SERPL: 12 (ref 7–25)
CALCIUM SERPL-MCNC: 9.7 MG/DL (ref 8.4–10.2)
CHLORIDE SERPL-SCNC: 105 MMOL/L (ref 98–107)
CO2 SERPL-SCNC: 23 MMOL/L (ref 21–32)
CREAT SERPL-MCNC: 0.97 MG/DL (ref 0.67–1.17)
DEPRECATED RDW RBC: 44.7 FL (ref 39–50)
EOSINOPHIL # BLD: 0 K/MCL (ref 0–0.5)
EOSINOPHIL NFR BLD: 0 %
ERYTHROCYTE [DISTWIDTH] IN BLOOD: 13.8 % (ref 11–15)
ETHANOL SERPL-MCNC: 45 MG/DL
FASTING DURATION TIME PATIENT: ABNORMAL H
GFR SERPLBLD BASED ON 1.73 SQ M-ARVRAT: >90 ML/MIN
GLUCOSE SERPL-MCNC: 120 MG/DL (ref 70–99)
HCT VFR BLD CALC: 44.9 % (ref 39–51)
HGB BLD-MCNC: 14.4 G/DL (ref 13–17)
IMM GRANULOCYTES # BLD AUTO: 0.1 K/MCL (ref 0–0.2)
IMM GRANULOCYTES # BLD: 0 %
LIPASE SERPL-CCNC: <50 UNITS/L (ref 73–393)
LYMPHOCYTES # BLD: 2.4 K/MCL (ref 1–4.8)
LYMPHOCYTES NFR BLD: 18 %
MAGNESIUM SERPL-MCNC: 2 MG/DL (ref 1.7–2.4)
MCH RBC QN AUTO: 28.3 PG (ref 26–34)
MCHC RBC AUTO-ENTMCNC: 32.1 G/DL (ref 32–36.5)
MCV RBC AUTO: 88.4 FL (ref 78–100)
MONOCYTES # BLD: 0.9 K/MCL (ref 0.3–0.9)
MONOCYTES NFR BLD: 7 %
NEUTROPHILS # BLD: 9.8 K/MCL (ref 1.8–7.7)
NEUTROPHILS NFR BLD: 74 %
NRBC BLD MANUAL-RTO: 0 /100 WBC
PLATELET # BLD AUTO: 341 K/MCL (ref 140–450)
POTASSIUM SERPL-SCNC: 3.3 MMOL/L (ref 3.4–5.1)
PROT SERPL-MCNC: 8.4 G/DL (ref 6.4–8.2)
RAINBOW EXTRA TUBES HOLD SPECIMEN: NORMAL
RBC # BLD: 5.08 MIL/MCL (ref 4.5–5.9)
SODIUM SERPL-SCNC: 138 MMOL/L (ref 135–145)
WBC # BLD: 13.2 K/MCL (ref 4.2–11)

## 2022-02-06 PROCEDURE — 96374 THER/PROPH/DIAG INJ IV PUSH: CPT

## 2022-02-06 PROCEDURE — G1004 CDSM NDSC: HCPCS

## 2022-02-06 PROCEDURE — 73552 X-RAY EXAM OF FEMUR 2/>: CPT

## 2022-02-06 PROCEDURE — 83735 ASSAY OF MAGNESIUM: CPT | Performed by: EMERGENCY MEDICINE

## 2022-02-06 PROCEDURE — 73706 CT ANGIO LWR EXTR W/O&W/DYE: CPT

## 2022-02-06 PROCEDURE — 83690 ASSAY OF LIPASE: CPT | Performed by: EMERGENCY MEDICINE

## 2022-02-06 PROCEDURE — 90715 TDAP VACCINE 7 YRS/> IM: CPT | Performed by: EMERGENCY MEDICINE

## 2022-02-06 PROCEDURE — 80076 HEPATIC FUNCTION PANEL: CPT | Performed by: EMERGENCY MEDICINE

## 2022-02-06 PROCEDURE — 96361 HYDRATE IV INFUSION ADD-ON: CPT

## 2022-02-06 PROCEDURE — 85025 COMPLETE CBC W/AUTO DIFF WBC: CPT | Performed by: EMERGENCY MEDICINE

## 2022-02-06 PROCEDURE — 82077 ASSAY SPEC XCP UR&BREATH IA: CPT | Performed by: EMERGENCY MEDICINE

## 2022-02-06 PROCEDURE — 90471 IMMUNIZATION ADMIN: CPT | Performed by: EMERGENCY MEDICINE

## 2022-02-06 PROCEDURE — 99285 EMERGENCY DEPT VISIT HI MDM: CPT

## 2022-02-06 PROCEDURE — 10002800 HB RX 250 W HCPCS: Performed by: EMERGENCY MEDICINE

## 2022-02-06 PROCEDURE — 10002805 HB CONTRAST AGENT: Performed by: EMERGENCY MEDICINE

## 2022-02-06 PROCEDURE — 80048 BASIC METABOLIC PNL TOTAL CA: CPT | Performed by: EMERGENCY MEDICINE

## 2022-02-06 PROCEDURE — 10002807 HB RX 258: Performed by: EMERGENCY MEDICINE

## 2022-02-06 RX ORDER — CEFDINIR 300 MG/1
300 CAPSULE ORAL 2 TIMES DAILY
Qty: 14 CAPSULE | Refills: 0 | Status: SHIPPED | OUTPATIENT
Start: 2022-02-06 | End: 2022-02-13

## 2022-02-06 RX ORDER — DIAZEPAM 5 MG/ML
5 INJECTION, SOLUTION INTRAMUSCULAR; INTRAVENOUS ONCE
Status: DISCONTINUED | OUTPATIENT
Start: 2022-02-06 | End: 2022-02-06

## 2022-02-06 RX ORDER — DIAZEPAM 5 MG/ML
10 INJECTION, SOLUTION INTRAMUSCULAR; INTRAVENOUS ONCE
Status: COMPLETED | OUTPATIENT
Start: 2022-02-06 | End: 2022-02-06

## 2022-02-06 RX ORDER — HYDROCODONE BITARTRATE AND ACETAMINOPHEN 5; 325 MG/1; MG/1
1-2 TABLET ORAL NIGHTLY PRN
Qty: 9 TABLET | Refills: 0 | Status: SHIPPED | OUTPATIENT
Start: 2022-02-06 | End: 2022-02-11

## 2022-02-06 RX ORDER — BUPIVACAINE HYDROCHLORIDE 2.5 MG/ML
30 INJECTION, SOLUTION EPIDURAL; INFILTRATION; INTRACAUDAL ONCE
Status: DISCONTINUED | OUTPATIENT
Start: 2022-02-06 | End: 2022-02-06 | Stop reason: HOSPADM

## 2022-02-06 RX ORDER — DOXYCYCLINE 100 MG/1
100 CAPSULE ORAL 2 TIMES DAILY
Qty: 14 CAPSULE | Refills: 0 | Status: SHIPPED | OUTPATIENT
Start: 2022-02-06 | End: 2022-02-13

## 2022-02-06 RX ADMIN — SODIUM CHLORIDE, POTASSIUM CHLORIDE, SODIUM LACTATE AND CALCIUM CHLORIDE 1000 ML: 600; 310; 30; 20 INJECTION, SOLUTION INTRAVENOUS at 02:45

## 2022-02-06 RX ADMIN — SODIUM CHLORIDE, POTASSIUM CHLORIDE, SODIUM LACTATE AND CALCIUM CHLORIDE 1000 ML: 600; 310; 30; 20 INJECTION, SOLUTION INTRAVENOUS at 02:41

## 2022-02-06 RX ADMIN — DIAZEPAM 10 MG: 5 INJECTION, SOLUTION INTRAMUSCULAR; INTRAVENOUS at 02:45

## 2022-02-06 RX ADMIN — IOHEXOL 100 ML: 350 INJECTION, SOLUTION INTRAVENOUS at 03:46

## 2022-02-06 RX ADMIN — TETANUS TOXOID, REDUCED DIPHTHERIA TOXOID AND ACELLULAR PERTUSSIS VACCINE, ADSORBED 0.5 ML: 5; 2.5; 8; 8; 2.5 SUSPENSION INTRAMUSCULAR at 04:08

## 2022-02-06 ASSESSMENT — PAIN SCALES - GENERAL: PAINLEVEL_OUTOF10: 3

## 2022-02-10 ENCOUNTER — HOSPITAL ENCOUNTER (OUTPATIENT)
Age: 27
Discharge: HOME | End: 2022-02-10
Attending: EMERGENCY MEDICINE
Payer: MEDICAID

## 2022-02-10 VITALS
HEART RATE: 76 BPM | DIASTOLIC BLOOD PRESSURE: 81 MMHG | TEMPERATURE: 99 F | WEIGHT: 230 LBS | HEIGHT: 75 IN | RESPIRATION RATE: 18 BRPM | BODY MASS INDEX: 28.6 KG/M2 | OXYGEN SATURATION: 99 % | SYSTOLIC BLOOD PRESSURE: 123 MMHG

## 2022-02-10 DIAGNOSIS — S71.132A GUNSHOT WOUND OF LEFT THIGH, INITIAL ENCOUNTER: Primary | ICD-10-CM

## 2022-02-10 PROCEDURE — 99213 OFFICE O/P EST LOW 20 MIN: CPT

## 2022-02-10 RX ORDER — HYDROCODONE BITARTRATE AND ACETAMINOPHEN 5; 325 MG/1; MG/1
1-2 TABLET ORAL EVERY 6 HOURS PRN
Qty: 20 TABLET | Refills: 0 | Status: SHIPPED | OUTPATIENT
Start: 2022-02-10 | End: 2022-02-15

## 2022-02-10 RX ORDER — HYDROCODONE BITARTRATE AND ACETAMINOPHEN 5; 325 MG/1; MG/1
2 TABLET ORAL ONCE
Status: COMPLETED | OUTPATIENT
Start: 2022-02-10 | End: 2022-02-10
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
## 2022-02-28 ENCOUNTER — OFFICE VISIT (OUTPATIENT)
Dept: INTERNAL MEDICINE CLINIC | Facility: CLINIC | Age: 27
End: 2022-02-28
Payer: MEDICAID

## 2022-02-28 ENCOUNTER — LAB ENCOUNTER (OUTPATIENT)
Dept: LAB | Age: 27
End: 2022-02-28
Attending: INTERNAL MEDICINE
Payer: MEDICAID

## 2022-02-28 VITALS
HEART RATE: 72 BPM | SYSTOLIC BLOOD PRESSURE: 118 MMHG | WEIGHT: 229.19 LBS | BODY MASS INDEX: 28.5 KG/M2 | HEIGHT: 75 IN | RESPIRATION RATE: 18 BRPM | DIASTOLIC BLOOD PRESSURE: 76 MMHG | TEMPERATURE: 99 F | OXYGEN SATURATION: 100 %

## 2022-02-28 DIAGNOSIS — J45.909 UNCOMPLICATED ASTHMA, UNSPECIFIED ASTHMA SEVERITY, UNSPECIFIED WHETHER PERSISTENT: ICD-10-CM
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
## 2022-02-28 DIAGNOSIS — Z11.3 SCREEN FOR SEXUALLY TRANSMITTED DISEASES: Primary | ICD-10-CM

## 2022-02-28 DIAGNOSIS — M79.605 PAIN OF LEFT LOWER EXTREMITY: Primary | ICD-10-CM

## 2022-02-28 DIAGNOSIS — Z00.00 LABORATORY EXAM ORDERED AS PART OF ROUTINE GENERAL MEDICAL EXAMINATION: ICD-10-CM

## 2022-02-28 DIAGNOSIS — Z11.3 SCREENING EXAMINATION FOR STD (SEXUALLY TRANSMITTED DISEASE): ICD-10-CM

## 2022-02-28 LAB
ALBUMIN SERPL-MCNC: 3.7 G/DL (ref 3.4–5)
ALBUMIN/GLOB SERPL: 1.1 {RATIO} (ref 1–2)
ALT SERPL-CCNC: 34 U/L
ANION GAP SERPL CALC-SCNC: 5 MMOL/L (ref 0–18)
AST SERPL-CCNC: 17 U/L (ref 15–37)
BASOPHILS # BLD AUTO: 0.07 X10(3) UL (ref 0–0.2)
BASOPHILS NFR BLD AUTO: 1.4 %
BILIRUB SERPL-MCNC: 0.4 MG/DL (ref 0.1–2)
BUN BLD-MCNC: 9 MG/DL (ref 7–18)
CALCIUM BLD-MCNC: 8.8 MG/DL (ref 8.5–10.1)
CHLORIDE SERPL-SCNC: 107 MMOL/L (ref 98–112)
CHOLEST SERPL-MCNC: 157 MG/DL (ref ?–200)
CO2 SERPL-SCNC: 27 MMOL/L (ref 21–32)
CREAT BLD-MCNC: 0.81 MG/DL
EOSINOPHIL # BLD AUTO: 0.08 X10(3) UL (ref 0–0.7)
EOSINOPHIL NFR BLD AUTO: 1.6 %
ERYTHROCYTE [DISTWIDTH] IN BLOOD BY AUTOMATED COUNT: 13.7 %
EST. AVERAGE GLUCOSE BLD GHB EST-MCNC: 105 MG/DL (ref 68–126)
FASTING PATIENT LIPID ANSWER: NO
FASTING STATUS PATIENT QL REPORTED: NO
GLOBULIN PLAS-MCNC: 3.4 G/DL (ref 2.8–4.4)
HBA1C MFR BLD: 5.3 % (ref ?–5.7)
HCT VFR BLD AUTO: 42.7 %
HDLC SERPL-MCNC: 49 MG/DL (ref 40–59)
HGB BLD-MCNC: 13.1 G/DL
IMM GRANULOCYTES # BLD AUTO: 0.01 X10(3) UL (ref 0–1)
IMM GRANULOCYTES NFR BLD: 0.2 %
LYMPHOCYTES # BLD AUTO: 2.71 X10(3) UL (ref 1–4)
LYMPHOCYTES NFR BLD AUTO: 54 %
MCH RBC QN AUTO: 27.9 PG (ref 26–34)
MCHC RBC AUTO-ENTMCNC: 30.7 G/DL (ref 31–37)
MCV RBC AUTO: 90.9 FL
MONOCYTES # BLD AUTO: 0.54 X10(3) UL (ref 0.1–1)
MONOCYTES NFR BLD AUTO: 10.8 %
NEUTROPHILS # BLD AUTO: 1.61 X10 (3) UL (ref 1.5–7.7)
NEUTROPHILS # BLD AUTO: 1.61 X10(3) UL (ref 1.5–7.7)
NEUTROPHILS NFR BLD AUTO: 32 %
NONHDLC SERPL-MCNC: 108 MG/DL (ref ?–130)
OSMOLALITY SERPL CALC.SUM OF ELEC: 286 MOSM/KG (ref 275–295)
PLATELET # BLD AUTO: 308 10(3)UL (ref 150–450)
POTASSIUM SERPL-SCNC: 4.3 MMOL/L (ref 3.5–5.1)
PROT SERPL-MCNC: 7.1 G/DL (ref 6.4–8.2)
RBC # BLD AUTO: 4.7 X10(6)UL
SODIUM SERPL-SCNC: 139 MMOL/L (ref 136–145)
TRIGL SERPL-MCNC: 79 MG/DL (ref 30–149)
TSI SER-ACNC: 0.9 MIU/ML (ref 0.36–3.74)
VIT D+METAB SERPL-MCNC: 21.1 NG/ML (ref 30–100)
VLDLC SERPL CALC-MCNC: 13 MG/DL (ref 0–30)
WBC # BLD AUTO: 5 X10(3) UL (ref 4–11)

## 2022-02-28 PROCEDURE — 99214 OFFICE O/P EST MOD 30 MIN: CPT | Performed by: INTERNAL MEDICINE

## 2022-02-28 PROCEDURE — 3008F BODY MASS INDEX DOCD: CPT | Performed by: INTERNAL MEDICINE

## 2022-02-28 PROCEDURE — 80053 COMPREHEN METABOLIC PANEL: CPT | Performed by: INTERNAL MEDICINE

## 2022-02-28 PROCEDURE — 3078F DIAST BP <80 MM HG: CPT | Performed by: INTERNAL MEDICINE

## 2022-02-28 PROCEDURE — 36415 COLL VENOUS BLD VENIPUNCTURE: CPT | Performed by: INTERNAL MEDICINE

## 2022-02-28 PROCEDURE — 82306 VITAMIN D 25 HYDROXY: CPT | Performed by: INTERNAL MEDICINE

## 2022-02-28 PROCEDURE — 87389 HIV-1 AG W/HIV-1&-2 AB AG IA: CPT | Performed by: INTERNAL MEDICINE

## 2022-02-28 PROCEDURE — 83036 HEMOGLOBIN GLYCOSYLATED A1C: CPT | Performed by: INTERNAL MEDICINE

## 2022-02-28 PROCEDURE — 85025 COMPLETE CBC W/AUTO DIFF WBC: CPT | Performed by: INTERNAL MEDICINE

## 2022-02-28 PROCEDURE — 80061 LIPID PANEL: CPT

## 2022-02-28 PROCEDURE — 86592 SYPHILIS TEST NON-TREP QUAL: CPT | Performed by: INTERNAL MEDICINE

## 2022-02-28 PROCEDURE — 3074F SYST BP LT 130 MM HG: CPT | Performed by: INTERNAL MEDICINE

## 2022-02-28 PROCEDURE — 84443 ASSAY THYROID STIM HORMONE: CPT | Performed by: INTERNAL MEDICINE

## 2022-02-28 RX ORDER — ALBUTEROL SULFATE 90 UG/1
2 AEROSOL, METERED RESPIRATORY (INHALATION) 4 TIMES DAILY PRN
Qty: 3 EACH | Refills: 3 | Status: SHIPPED | OUTPATIENT
Start: 2022-02-28

## 2022-02-28 RX ORDER — TRIAMCINOLONE ACETONIDE 0.25 MG/G
1 CREAM TOPICAL 2 TIMES DAILY
Qty: 15 G | Refills: 0 | Status: CANCELLED | OUTPATIENT
Start: 2022-02-28

## 2022-03-01 LAB — RPR SER QL: NONREACTIVE

## 2022-03-25 ENCOUNTER — OFFICE VISIT (OUTPATIENT)
Dept: SURGERY | Facility: CLINIC | Age: 27
End: 2022-03-25
Payer: MEDICAID

## 2022-03-25 VITALS — SYSTOLIC BLOOD PRESSURE: 93 MMHG | HEART RATE: 82 BPM | TEMPERATURE: 97 F | DIASTOLIC BLOOD PRESSURE: 65 MMHG

## 2022-03-25 DIAGNOSIS — S81.832A GUNSHOT WOUND OF LEFT LOWER EXTREMITY, INITIAL ENCOUNTER: ICD-10-CM
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
## 2022-03-25 PROCEDURE — 3078F DIAST BP <80 MM HG: CPT | Performed by: SURGERY

## 2022-03-25 PROCEDURE — 3074F SYST BP LT 130 MM HG: CPT | Performed by: SURGERY

## 2022-03-25 PROCEDURE — 99244 OFF/OP CNSLTJ NEW/EST MOD 40: CPT | Performed by: SURGERY

## 2022-04-12 ENCOUNTER — HOSPITAL ENCOUNTER (OUTPATIENT)
Dept: MRI IMAGING | Facility: HOSPITAL | Age: 27
Discharge: HOME | End: 2022-04-12
Attending: INTERNAL MEDICINE
Payer: MEDICAID

## 2022-04-12 DIAGNOSIS — M79.605 PAIN OF LEFT LOWER EXTREMITY: ICD-10-CM
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
## 2022-04-12 PROCEDURE — 73720 MRI LWR EXTREMITY W/O&W/DYE: CPT | Performed by: INTERNAL MEDICINE

## 2022-04-12 PROCEDURE — A9575 INJ GADOTERATE MEGLUMI 0.1ML: HCPCS | Performed by: INTERNAL MEDICINE

## 2022-05-10 ENCOUNTER — TELEPHONE (OUTPATIENT)
Dept: SURGERY | Facility: CLINIC | Age: 27
End: 2022-05-10

## 2022-05-10 NOTE — TELEPHONE ENCOUNTER
Rec. For pt to have PT for thigh pain and to see neurologist for nerve pain and pain management.  Pt VU

## 2022-06-07 ENCOUNTER — OFFICE VISIT (OUTPATIENT)
Dept: PHYSICAL THERAPY | Age: 27
End: 2022-06-07
Attending: SURGERY
Payer: MEDICAID

## 2022-06-07 ENCOUNTER — APPOINTMENT (OUTPATIENT)
Dept: PHYSICAL THERAPY | Age: 27
End: 2022-06-07
Attending: SURGERY
Payer: MEDICAID

## 2022-06-07 PROCEDURE — 97162 PT EVAL MOD COMPLEX 30 MIN: CPT

## 2022-06-07 PROCEDURE — 97110 THERAPEUTIC EXERCISES: CPT

## 2022-06-10 ENCOUNTER — APPOINTMENT (OUTPATIENT)
Dept: PHYSICAL THERAPY | Age: 27
End: 2022-06-10
Attending: SURGERY
Payer: MEDICAID

## 2022-06-14 ENCOUNTER — OFFICE VISIT (OUTPATIENT)
Dept: PHYSICAL THERAPY | Age: 27
End: 2022-06-14
Attending: SURGERY
Payer: MEDICAID

## 2022-06-14 PROCEDURE — 97140 MANUAL THERAPY 1/> REGIONS: CPT

## 2022-06-14 PROCEDURE — 97110 THERAPEUTIC EXERCISES: CPT

## 2022-06-17 ENCOUNTER — OFFICE VISIT (OUTPATIENT)
Dept: PHYSICAL THERAPY | Age: 27
End: 2022-06-17
Attending: SURGERY
Payer: MEDICAID

## 2022-06-17 PROCEDURE — 97140 MANUAL THERAPY 1/> REGIONS: CPT

## 2022-06-17 PROCEDURE — 97110 THERAPEUTIC EXERCISES: CPT

## 2022-06-21 ENCOUNTER — OFFICE VISIT (OUTPATIENT)
Dept: PHYSICAL THERAPY | Age: 27
End: 2022-06-21
Attending: SURGERY
Payer: MEDICAID

## 2022-06-21 PROCEDURE — 97110 THERAPEUTIC EXERCISES: CPT

## 2022-06-21 PROCEDURE — 97140 MANUAL THERAPY 1/> REGIONS: CPT

## 2022-06-23 ENCOUNTER — APPOINTMENT (OUTPATIENT)
Dept: PHYSICAL THERAPY | Age: 27
End: 2022-06-23
Attending: SURGERY
Payer: MEDICAID

## 2022-06-28 ENCOUNTER — OFFICE VISIT (OUTPATIENT)
Dept: PHYSICAL THERAPY | Age: 27
End: 2022-06-28
Attending: SURGERY
Payer: MEDICAID

## 2022-06-28 PROCEDURE — 97112 NEUROMUSCULAR REEDUCATION: CPT

## 2022-06-28 PROCEDURE — 97140 MANUAL THERAPY 1/> REGIONS: CPT

## 2022-06-28 PROCEDURE — 97110 THERAPEUTIC EXERCISES: CPT

## 2022-06-30 ENCOUNTER — OFFICE VISIT (OUTPATIENT)
Dept: PHYSICAL THERAPY | Age: 27
End: 2022-06-30
Attending: SURGERY
Payer: MEDICAID

## 2022-06-30 PROCEDURE — 97140 MANUAL THERAPY 1/> REGIONS: CPT

## 2022-06-30 PROCEDURE — 97110 THERAPEUTIC EXERCISES: CPT

## 2022-07-06 ENCOUNTER — TELEPHONE (OUTPATIENT)
Dept: PHYSICAL THERAPY | Facility: HOSPITAL | Age: 27
End: 2022-07-06

## 2022-07-14 ENCOUNTER — OFFICE VISIT (OUTPATIENT)
Dept: PHYSICAL THERAPY | Age: 27
End: 2022-07-14
Attending: INTERNAL MEDICINE
Payer: MEDICAID

## 2022-07-14 PROCEDURE — 97140 MANUAL THERAPY 1/> REGIONS: CPT

## 2022-07-14 PROCEDURE — 97110 THERAPEUTIC EXERCISES: CPT

## 2022-07-21 ENCOUNTER — OFFICE VISIT (OUTPATIENT)
Dept: PHYSICAL THERAPY | Age: 27
End: 2022-07-21
Attending: INTERNAL MEDICINE
Payer: MEDICAID

## 2022-07-21 PROCEDURE — 97140 MANUAL THERAPY 1/> REGIONS: CPT

## 2022-07-21 PROCEDURE — 97110 THERAPEUTIC EXERCISES: CPT

## 2022-07-26 ENCOUNTER — OFFICE VISIT (OUTPATIENT)
Dept: PHYSICAL THERAPY | Age: 27
End: 2022-07-26
Attending: INTERNAL MEDICINE
Payer: MEDICAID

## 2022-07-26 PROCEDURE — 97140 MANUAL THERAPY 1/> REGIONS: CPT

## 2022-07-26 PROCEDURE — 97110 THERAPEUTIC EXERCISES: CPT

## 2022-07-28 ENCOUNTER — APPOINTMENT (OUTPATIENT)
Dept: PHYSICAL THERAPY | Age: 27
End: 2022-07-28
Attending: INTERNAL MEDICINE
Payer: MEDICAID

## 2022-08-02 ENCOUNTER — APPOINTMENT (OUTPATIENT)
Dept: PHYSICAL THERAPY | Age: 27
End: 2022-08-02
Attending: INTERNAL MEDICINE
Payer: MEDICAID

## 2022-08-04 ENCOUNTER — OFFICE VISIT (OUTPATIENT)
Dept: PHYSICAL THERAPY | Age: 27
End: 2022-08-04
Attending: INTERNAL MEDICINE
Payer: MEDICAID

## 2022-08-04 PROCEDURE — 97110 THERAPEUTIC EXERCISES: CPT

## 2022-08-12 ENCOUNTER — APPOINTMENT (OUTPATIENT)
Dept: PHYSICAL THERAPY | Age: 27
End: 2022-08-12
Attending: INTERNAL MEDICINE
Payer: MEDICAID

## 2022-08-12 ENCOUNTER — TELEPHONE (OUTPATIENT)
Dept: PHYSICAL THERAPY | Age: 27
End: 2022-08-12

## 2022-08-12 NOTE — TELEPHONE ENCOUNTER
I called pt since he was 15 min late for his appt. I left a message notifying him of missed appt and that I would cancel the visit. Confirmed next visit. I asked he call with > 24 hr notice if he is unable to attend PT.

## 2022-08-19 ENCOUNTER — APPOINTMENT (OUTPATIENT)
Dept: PHYSICAL THERAPY | Age: 27
End: 2022-08-19
Attending: INTERNAL MEDICINE
Payer: MEDICAID

## 2022-11-21 ENCOUNTER — OFFICE VISIT (OUTPATIENT)
Dept: INTERNAL MEDICINE CLINIC | Facility: CLINIC | Age: 27
End: 2022-11-21
Payer: MEDICAID

## 2022-11-21 VITALS
HEART RATE: 106 BPM | WEIGHT: 219.19 LBS | OXYGEN SATURATION: 100 % | SYSTOLIC BLOOD PRESSURE: 110 MMHG | BODY MASS INDEX: 27 KG/M2 | RESPIRATION RATE: 17 BRPM | DIASTOLIC BLOOD PRESSURE: 64 MMHG

## 2022-11-21 DIAGNOSIS — M79.605 LEFT LEG PAIN: ICD-10-CM

## 2022-11-21 DIAGNOSIS — Z20.2 STD EXPOSURE: ICD-10-CM

## 2022-11-21 DIAGNOSIS — R36.9 PENILE DISCHARGE: Primary | ICD-10-CM

## 2022-11-21 DIAGNOSIS — Z11.3 SCREEN FOR STD (SEXUALLY TRANSMITTED DISEASE): ICD-10-CM

## 2022-11-21 DIAGNOSIS — M79.672 LEFT FOOT PAIN: ICD-10-CM

## 2022-11-21 DIAGNOSIS — R30.0 DYSURIA: ICD-10-CM

## 2022-11-21 DIAGNOSIS — Z87.828 HISTORY OF GUNSHOT WOUND: ICD-10-CM

## 2022-11-21 RX ORDER — CEFTRIAXONE 500 MG/1
500 INJECTION, POWDER, FOR SOLUTION INTRAMUSCULAR; INTRAVENOUS ONCE
Status: COMPLETED | OUTPATIENT
Start: 2022-11-21 | End: 2022-11-21

## 2022-11-21 RX ORDER — DOXYCYCLINE HYCLATE 100 MG/1
100 CAPSULE ORAL 2 TIMES DAILY
Qty: 14 CAPSULE | Refills: 0 | Status: SHIPPED | OUTPATIENT
Start: 2022-11-21 | End: 2022-11-28

## 2022-11-21 RX ADMIN — CEFTRIAXONE 500 MG: 500 INJECTION, POWDER, FOR SOLUTION INTRAMUSCULAR; INTRAVENOUS at 10:06:00

## 2022-11-22 ENCOUNTER — TELEPHONE (OUTPATIENT)
Dept: INTERNAL MEDICINE CLINIC | Facility: CLINIC | Age: 27
End: 2022-11-22

## 2022-11-22 NOTE — TELEPHONE ENCOUNTER
Pt was supposed to go to lab but did not. Please ask him to go ASAP today. Also remind him that it is very important that he complete antibiotic course (doxycycline) sent in for chlamydia to the pharmacy and abstain from sexual activity until complete course and follows up for repeat testing. The shot he received yesterday is to help cover in case he has gonorrhea too given his history. He needs to also complete oral abx course as above.
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
## 2023-01-13 ENCOUNTER — LAB ENCOUNTER (OUTPATIENT)
Dept: LAB | Age: 28
End: 2023-01-13
Attending: INTERNAL MEDICINE
Payer: MEDICAID

## 2023-01-13 LAB
BILIRUB UR QL STRIP.AUTO: NEGATIVE
CHOLEST SERPL-MCNC: 179 MG/DL (ref ?–200)
CLARITY UR REFRACT.AUTO: CLEAR
COLOR UR AUTO: YELLOW
FASTING PATIENT LIPID ANSWER: YES
GLUCOSE UR STRIP.AUTO-MCNC: NEGATIVE MG/DL
HDLC SERPL-MCNC: 60 MG/DL (ref 40–59)
KETONES UR STRIP.AUTO-MCNC: NEGATIVE MG/DL
LDLC SERPL CALC-MCNC: 104 MG/DL (ref ?–100)
LEUKOCYTE ESTERASE UR QL STRIP.AUTO: NEGATIVE
NITRITE UR QL STRIP.AUTO: NEGATIVE
NONHDLC SERPL-MCNC: 119 MG/DL (ref ?–130)
PH UR STRIP.AUTO: 7 [PH] (ref 5–8)
RBC UR QL AUTO: NEGATIVE
SP GR UR STRIP.AUTO: 1.02 (ref 1–1.03)
T PALLIDUM AB SER QL IA: NONREACTIVE
TRIGL SERPL-MCNC: 84 MG/DL (ref 30–149)
UROBILINOGEN UR STRIP.AUTO-MCNC: 0.2 MG/DL
VLDLC SERPL CALC-MCNC: 14 MG/DL (ref 0–30)

## 2023-01-13 PROCEDURE — 86780 TREPONEMA PALLIDUM: CPT | Performed by: INTERNAL MEDICINE

## 2023-01-13 PROCEDURE — 81003 URINALYSIS AUTO W/O SCOPE: CPT | Performed by: INTERNAL MEDICINE

## 2023-01-13 PROCEDURE — 80061 LIPID PANEL: CPT | Performed by: INTERNAL MEDICINE

## 2023-01-13 PROCEDURE — 36415 COLL VENOUS BLD VENIPUNCTURE: CPT | Performed by: INTERNAL MEDICINE

## 2023-01-13 PROCEDURE — 87389 HIV-1 AG W/HIV-1&-2 AB AG IA: CPT | Performed by: INTERNAL MEDICINE

## 2023-03-30 ENCOUNTER — LAB ENCOUNTER (OUTPATIENT)
Dept: LAB | Age: 28
End: 2023-03-30
Attending: INTERNAL MEDICINE
Payer: MEDICAID

## 2023-03-30 PROCEDURE — 87591 N.GONORRHOEAE DNA AMP PROB: CPT | Performed by: INTERNAL MEDICINE

## 2023-03-30 PROCEDURE — 87491 CHLMYD TRACH DNA AMP PROBE: CPT | Performed by: INTERNAL MEDICINE

## 2023-03-31 LAB
C TRACH DNA SPEC QL NAA+PROBE: NEGATIVE
N GONORRHOEA DNA SPEC QL NAA+PROBE: NEGATIVE

## 2023-04-24 ENCOUNTER — HOSPITAL ENCOUNTER (OUTPATIENT)
Age: 28
Discharge: HOME | End: 2023-04-24
Payer: MEDICAID

## 2023-04-24 VITALS
RESPIRATION RATE: 16 BRPM | SYSTOLIC BLOOD PRESSURE: 109 MMHG | TEMPERATURE: 97 F | DIASTOLIC BLOOD PRESSURE: 71 MMHG | BODY MASS INDEX: 28 KG/M2 | HEART RATE: 68 BPM | OXYGEN SATURATION: 99 % | WEIGHT: 223 LBS

## 2023-04-24 DIAGNOSIS — Z20.2 POSSIBLE EXPOSURE TO STD: ICD-10-CM

## 2023-04-24 DIAGNOSIS — R36.9 PENILE DISCHARGE: Primary | ICD-10-CM

## 2023-04-24 LAB
POCT BILIRUBIN URINE: NEGATIVE
POCT BLOOD URINE: NEGATIVE
POCT GLUCOSE URINE: NEGATIVE MG/DL
POCT KETONE URINE: NEGATIVE MG/DL
POCT LEUKOCYTE ESTERASE URINE: NEGATIVE
POCT NITRITE URINE: NEGATIVE
POCT PH URINE: 8.5 (ref 5–8)
POCT PROTEIN URINE: NEGATIVE MG/DL
POCT SPECIFIC GRAVITY URINE: 1.02
POCT URINE CLARITY: CLEAR
POCT URINE COLOR: YELLOW
POCT UROBILINOGEN URINE: 1 MG/DL

## 2023-04-24 PROCEDURE — 87591 N.GONORRHOEAE DNA AMP PROB: CPT | Performed by: PHYSICIAN ASSISTANT

## 2023-04-24 PROCEDURE — 96372 THER/PROPH/DIAG INJ SC/IM: CPT

## 2023-04-24 PROCEDURE — 81002 URINALYSIS NONAUTO W/O SCOPE: CPT | Performed by: PHYSICIAN ASSISTANT

## 2023-04-24 PROCEDURE — 99213 OFFICE O/P EST LOW 20 MIN: CPT

## 2023-04-24 PROCEDURE — 99214 OFFICE O/P EST MOD 30 MIN: CPT

## 2023-04-24 PROCEDURE — 87491 CHLMYD TRACH DNA AMP PROBE: CPT | Performed by: PHYSICIAN ASSISTANT

## 2023-04-24 RX ORDER — DOXYCYCLINE HYCLATE 100 MG/1
100 CAPSULE ORAL 2 TIMES DAILY
Qty: 14 CAPSULE | Refills: 0 | Status: SHIPPED | OUTPATIENT
Start: 2023-04-24 | End: 2023-05-01

## 2023-04-24 NOTE — DISCHARGE INSTRUCTIONS
No Sexual intercourse until testing is completed and you have results, you will need to inform all partners if these are positive, you should abstain from sexual intercourse for least 1 week following treatment plan    Make sure to take full antibiotic prescription

## 2023-04-25 LAB
C TRACH DNA SPEC QL NAA+PROBE: NEGATIVE
N GONORRHOEA DNA SPEC QL NAA+PROBE: NEGATIVE

## 2023-07-27 ENCOUNTER — HOSPITAL ENCOUNTER (OUTPATIENT)
Age: 28
Discharge: HOME | End: 2023-07-27
Payer: MEDICAID

## 2023-07-27 VITALS
TEMPERATURE: 98 F | HEART RATE: 68 BPM | DIASTOLIC BLOOD PRESSURE: 72 MMHG | BODY MASS INDEX: 27.35 KG/M2 | OXYGEN SATURATION: 98 % | RESPIRATION RATE: 20 BRPM | SYSTOLIC BLOOD PRESSURE: 122 MMHG | HEIGHT: 75 IN | WEIGHT: 220 LBS

## 2023-07-27 DIAGNOSIS — Z20.2 EXPOSURE TO CHLAMYDIA: Primary | ICD-10-CM

## 2023-07-27 DIAGNOSIS — R30.0 DYSURIA: ICD-10-CM

## 2023-07-27 LAB
POCT BILIRUBIN URINE: NEGATIVE
POCT GLUCOSE URINE: NEGATIVE MG/DL
POCT KETONE URINE: NEGATIVE MG/DL
POCT LEUKOCYTE ESTERASE URINE: NEGATIVE
POCT NITRITE URINE: NEGATIVE
POCT PH URINE: 7 (ref 5–8)
POCT PROTEIN URINE: NEGATIVE MG/DL
POCT SPECIFIC GRAVITY URINE: 1.01
POCT URINE CLARITY: CLEAR
POCT URINE COLOR: YELLOW
POCT UROBILINOGEN URINE: 0.2 MG/DL

## 2023-07-27 PROCEDURE — 87491 CHLMYD TRACH DNA AMP PROBE: CPT | Performed by: NURSE PRACTITIONER

## 2023-07-27 PROCEDURE — 87591 N.GONORRHOEAE DNA AMP PROB: CPT | Performed by: NURSE PRACTITIONER

## 2023-07-27 PROCEDURE — 99213 OFFICE O/P EST LOW 20 MIN: CPT

## 2023-07-27 PROCEDURE — 99214 OFFICE O/P EST MOD 30 MIN: CPT

## 2023-07-27 PROCEDURE — 81002 URINALYSIS NONAUTO W/O SCOPE: CPT | Performed by: NURSE PRACTITIONER

## 2023-07-27 PROCEDURE — 96372 THER/PROPH/DIAG INJ SC/IM: CPT

## 2023-07-27 RX ORDER — DOXYCYCLINE HYCLATE 100 MG/1
100 CAPSULE ORAL 2 TIMES DAILY
Qty: 14 CAPSULE | Refills: 0 | Status: SHIPPED | OUTPATIENT
Start: 2023-07-27 | End: 2023-08-03

## 2023-07-27 NOTE — ED INITIAL ASSESSMENT (HPI)
Pt c/o of urinary pain and frequency starting 5 days ago, pt stating that a partner told him that she had chlamydia, +clear drainage

## 2023-07-28 LAB
C TRACH DNA SPEC QL NAA+PROBE: NEGATIVE
N GONORRHOEA DNA SPEC QL NAA+PROBE: NEGATIVE

## 2023-09-18 ENCOUNTER — HOSPITAL ENCOUNTER (OUTPATIENT)
Age: 28
Discharge: HOME | End: 2023-09-18
Attending: STUDENT IN AN ORGANIZED HEALTH CARE EDUCATION/TRAINING PROGRAM
Payer: MEDICAID

## 2023-09-18 VITALS
HEIGHT: 75 IN | SYSTOLIC BLOOD PRESSURE: 116 MMHG | DIASTOLIC BLOOD PRESSURE: 90 MMHG | WEIGHT: 211 LBS | TEMPERATURE: 98 F | OXYGEN SATURATION: 100 % | BODY MASS INDEX: 26.24 KG/M2 | RESPIRATION RATE: 16 BRPM | HEART RATE: 68 BPM

## 2023-09-18 DIAGNOSIS — N34.2 URETHRITIS: Primary | ICD-10-CM

## 2023-09-18 LAB
BILIRUB UR QL STRIP: NEGATIVE
CLARITY UR: CLEAR
COLOR UR: YELLOW
GLUCOSE UR STRIP-MCNC: NEGATIVE MG/DL
HGB UR QL STRIP: NEGATIVE
KETONES UR STRIP-MCNC: NEGATIVE MG/DL
LEUKOCYTE ESTERASE UR QL STRIP: NEGATIVE
NITRITE UR QL STRIP: NEGATIVE
PH UR STRIP: 6 [PH]
PROT UR STRIP-MCNC: NEGATIVE MG/DL
SP GR UR STRIP: 1.02
UROBILINOGEN UR STRIP-ACNC: <2 MG/DL

## 2023-09-18 PROCEDURE — 81002 URINALYSIS NONAUTO W/O SCOPE: CPT

## 2023-09-18 PROCEDURE — 87086 URINE CULTURE/COLONY COUNT: CPT | Performed by: STUDENT IN AN ORGANIZED HEALTH CARE EDUCATION/TRAINING PROGRAM

## 2023-09-18 PROCEDURE — 96372 THER/PROPH/DIAG INJ SC/IM: CPT

## 2023-09-18 PROCEDURE — 87591 N.GONORRHOEAE DNA AMP PROB: CPT | Performed by: STUDENT IN AN ORGANIZED HEALTH CARE EDUCATION/TRAINING PROGRAM

## 2023-09-18 PROCEDURE — 87491 CHLMYD TRACH DNA AMP PROBE: CPT | Performed by: STUDENT IN AN ORGANIZED HEALTH CARE EDUCATION/TRAINING PROGRAM

## 2023-09-18 PROCEDURE — 99214 OFFICE O/P EST MOD 30 MIN: CPT

## 2023-09-19 LAB
C TRACH DNA SPEC QL NAA+PROBE: NEGATIVE
N GONORRHOEA DNA SPEC QL NAA+PROBE: NEGATIVE

## 2023-11-03 ENCOUNTER — APPOINTMENT (OUTPATIENT)
Dept: GENERAL RADIOLOGY | Age: 28
End: 2023-11-03
Attending: NURSE PRACTITIONER
Payer: MEDICAID

## 2023-11-03 ENCOUNTER — HOSPITAL ENCOUNTER (OUTPATIENT)
Age: 28
Discharge: HOME | End: 2023-11-03
Payer: MEDICAID

## 2023-11-03 VITALS
HEART RATE: 75 BPM | HEIGHT: 75 IN | WEIGHT: 211 LBS | RESPIRATION RATE: 18 BRPM | TEMPERATURE: 98 F | DIASTOLIC BLOOD PRESSURE: 68 MMHG | OXYGEN SATURATION: 100 % | SYSTOLIC BLOOD PRESSURE: 124 MMHG | BODY MASS INDEX: 26.24 KG/M2

## 2023-11-03 DIAGNOSIS — M94.0 COSTOCHONDRITIS: ICD-10-CM

## 2023-11-03 DIAGNOSIS — R07.89 CHEST WALL PAIN: Primary | ICD-10-CM

## 2023-11-03 LAB
#MXD IC: 0.7 X10ˆ3/UL (ref 0.1–1)
ATRIAL RATE: 75 BPM
BUN BLD-MCNC: 8 MG/DL (ref 7–18)
CHLORIDE BLD-SCNC: 102 MMOL/L (ref 98–112)
CO2 BLD-SCNC: 29 MMOL/L (ref 21–32)
CREAT BLD-MCNC: 0.9 MG/DL
DDIMER WHOLE BLOOD: <200 NG/ML DDU (ref ?–400)
EGFRCR SERPLBLD CKD-EPI 2021: 121 ML/MIN/1.73M2 (ref 60–?)
GLUCOSE BLD-MCNC: 84 MG/DL (ref 70–99)
HCT VFR BLD AUTO: 45.2 %
HCT VFR BLD CALC: 44 %
HGB BLD-MCNC: 14.7 G/DL
ISTAT IONIZED CALCIUM FOR CHEM 8: 1.25 MMOL/L (ref 1.12–1.32)
LYMPHOCYTES # BLD AUTO: 1.9 X10ˆ3/UL (ref 1–4)
LYMPHOCYTES NFR BLD AUTO: 31.4 %
MCH RBC QN AUTO: 29.4 PG (ref 26–34)
MCHC RBC AUTO-ENTMCNC: 32.5 G/DL (ref 31–37)
MCV RBC AUTO: 90.4 FL (ref 80–100)
MIXED CELL %: 11.7 %
NEUTROPHILS # BLD AUTO: 3.6 X10ˆ3/UL (ref 1.5–7.7)
NEUTROPHILS NFR BLD AUTO: 56.9 %
P AXIS: 37 DEGREES
P-R INTERVAL: 128 MS
PLATELET # BLD AUTO: 337 X10ˆ3/UL (ref 150–450)
POTASSIUM BLD-SCNC: 3.9 MMOL/L (ref 3.6–5.1)
Q-T INTERVAL: 356 MS
QRS DURATION: 82 MS
QTC CALCULATION (BEZET): 397 MS
R AXIS: 72 DEGREES
RBC # BLD AUTO: 5 X10ˆ6/UL
SODIUM BLD-SCNC: 142 MMOL/L (ref 136–145)
T AXIS: 46 DEGREES
TROPONIN I BLD-MCNC: <0.02 NG/ML
VENTRICULAR RATE: 75 BPM
WBC # BLD AUTO: 6.2 X10ˆ3/UL (ref 4–11)

## 2023-11-03 PROCEDURE — 85025 COMPLETE CBC W/AUTO DIFF WBC: CPT | Performed by: NURSE PRACTITIONER

## 2023-11-03 PROCEDURE — 85378 FIBRIN DEGRADE SEMIQUANT: CPT | Performed by: NURSE PRACTITIONER

## 2023-11-03 PROCEDURE — 71046 X-RAY EXAM CHEST 2 VIEWS: CPT | Performed by: NURSE PRACTITIONER

## 2023-11-03 PROCEDURE — 80047 BASIC METABLC PNL IONIZED CA: CPT

## 2023-11-03 PROCEDURE — 93005 ELECTROCARDIOGRAM TRACING: CPT

## 2023-11-03 PROCEDURE — 84484 ASSAY OF TROPONIN QUANT: CPT

## 2023-11-03 RX ORDER — IBUPROFEN 600 MG/1
600 TABLET ORAL EVERY 8 HOURS PRN
Qty: 20 TABLET | Refills: 0 | Status: SHIPPED | OUTPATIENT
Start: 2023-11-03 | End: 2023-11-10

## 2023-11-03 RX ORDER — ALBUTEROL SULFATE 90 UG/1
2 AEROSOL, METERED RESPIRATORY (INHALATION) EVERY 4 HOURS PRN
Qty: 1 EACH | Refills: 0 | Status: SHIPPED | OUTPATIENT
Start: 2023-11-03 | End: 2023-12-03

## 2023-11-03 RX ORDER — KETOROLAC TROMETHAMINE 30 MG/ML
30 INJECTION, SOLUTION INTRAMUSCULAR; INTRAVENOUS ONCE
Status: COMPLETED | OUTPATIENT
Start: 2023-11-03 | End: 2023-11-03

## 2023-11-17 ENCOUNTER — HOSPITAL ENCOUNTER (OUTPATIENT)
Age: 28
Discharge: HOME | End: 2023-11-17
Payer: MEDICAID

## 2023-11-17 VITALS
SYSTOLIC BLOOD PRESSURE: 112 MMHG | WEIGHT: 211 LBS | TEMPERATURE: 98 F | BODY MASS INDEX: 26 KG/M2 | RESPIRATION RATE: 18 BRPM | HEART RATE: 77 BPM | OXYGEN SATURATION: 99 % | DIASTOLIC BLOOD PRESSURE: 65 MMHG

## 2023-11-17 DIAGNOSIS — Z20.2 POSSIBLE EXPOSURE TO STD: ICD-10-CM

## 2023-11-17 DIAGNOSIS — R36.0 PENILE DISCHARGE, WITHOUT BLOOD: Primary | ICD-10-CM

## 2023-11-17 LAB
BILIRUB UR QL STRIP: NEGATIVE
CLARITY UR: CLEAR
GLUCOSE UR STRIP-MCNC: NEGATIVE MG/DL
HGB UR QL STRIP: NEGATIVE
KETONES UR STRIP-MCNC: NEGATIVE MG/DL
LEUKOCYTE ESTERASE UR QL STRIP: NEGATIVE
NITRITE UR QL STRIP: NEGATIVE
PH UR STRIP: 6 [PH]
PROT UR STRIP-MCNC: NEGATIVE MG/DL
SP GR UR STRIP: 1.02
UROBILINOGEN UR STRIP-ACNC: <2 MG/DL

## 2023-11-17 PROCEDURE — 96372 THER/PROPH/DIAG INJ SC/IM: CPT

## 2023-11-17 PROCEDURE — 81002 URINALYSIS NONAUTO W/O SCOPE: CPT

## 2023-11-17 PROCEDURE — 87591 N.GONORRHOEAE DNA AMP PROB: CPT | Performed by: PHYSICIAN ASSISTANT

## 2023-11-17 PROCEDURE — 87491 CHLMYD TRACH DNA AMP PROBE: CPT | Performed by: PHYSICIAN ASSISTANT

## 2023-11-17 PROCEDURE — 99214 OFFICE O/P EST MOD 30 MIN: CPT

## 2023-11-17 RX ORDER — DOXYCYCLINE HYCLATE 100 MG/1
100 CAPSULE ORAL 2 TIMES DAILY
Qty: 14 CAPSULE | Refills: 0 | Status: SHIPPED | OUTPATIENT
Start: 2023-11-17 | End: 2023-11-24

## 2023-11-20 LAB
C TRACH DNA SPEC QL NAA+PROBE: POSITIVE
N GONORRHOEA DNA SPEC QL NAA+PROBE: NEGATIVE

## 2023-12-07 ENCOUNTER — HOSPITAL ENCOUNTER (OUTPATIENT)
Age: 28
Discharge: HOME | End: 2023-12-07
Payer: MEDICAID

## 2023-12-07 VITALS
DIASTOLIC BLOOD PRESSURE: 80 MMHG | OXYGEN SATURATION: 98 % | WEIGHT: 211 LBS | RESPIRATION RATE: 18 BRPM | BODY MASS INDEX: 26.24 KG/M2 | TEMPERATURE: 97 F | SYSTOLIC BLOOD PRESSURE: 125 MMHG | HEART RATE: 77 BPM | HEIGHT: 75 IN

## 2023-12-07 DIAGNOSIS — Z20.2 ENCOUNTER FOR ASSESSMENT OF STD EXPOSURE: Primary | ICD-10-CM

## 2023-12-07 LAB
BILIRUB UR QL STRIP: NEGATIVE
CLARITY UR: CLEAR
COLOR UR: YELLOW
GLUCOSE UR STRIP-MCNC: NEGATIVE MG/DL
HGB UR QL STRIP: NEGATIVE
KETONES UR STRIP-MCNC: NEGATIVE MG/DL
LEUKOCYTE ESTERASE UR QL STRIP: NEGATIVE
NITRITE UR QL STRIP: NEGATIVE
PH UR STRIP: 7 [PH]
PROT UR STRIP-MCNC: NEGATIVE MG/DL
SP GR UR STRIP: 1.02
UROBILINOGEN UR STRIP-ACNC: <2 MG/DL

## 2023-12-07 PROCEDURE — 81002 URINALYSIS NONAUTO W/O SCOPE: CPT

## 2023-12-07 PROCEDURE — 87591 N.GONORRHOEAE DNA AMP PROB: CPT | Performed by: PHYSICIAN ASSISTANT

## 2023-12-07 PROCEDURE — 99212 OFFICE O/P EST SF 10 MIN: CPT

## 2023-12-07 PROCEDURE — 87491 CHLMYD TRACH DNA AMP PROBE: CPT | Performed by: PHYSICIAN ASSISTANT

## 2023-12-07 PROCEDURE — 99213 OFFICE O/P EST LOW 20 MIN: CPT

## 2023-12-07 NOTE — DISCHARGE INSTRUCTIONS
Please return to the ER/clinic if symptoms worsen. Follow-up with your PCP in 24-48 hours as needed. Wear condoms  at all times when engaging in sexual activity. Follow-up with your primary care physician or the [facility] department for further evaluation and treatment.
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
## 2023-12-08 LAB
C TRACH DNA SPEC QL NAA+PROBE: NEGATIVE
N GONORRHOEA DNA SPEC QL NAA+PROBE: NEGATIVE

## 2024-04-10 ENCOUNTER — HOSPITAL ENCOUNTER (OUTPATIENT)
Age: 29
Discharge: HOME | End: 2024-04-10
Payer: MEDICAID

## 2024-04-10 VITALS
SYSTOLIC BLOOD PRESSURE: 122 MMHG | BODY MASS INDEX: 27.08 KG/M2 | WEIGHT: 211 LBS | RESPIRATION RATE: 16 BRPM | HEART RATE: 81 BPM | HEIGHT: 74 IN | TEMPERATURE: 98 F | OXYGEN SATURATION: 98 % | DIASTOLIC BLOOD PRESSURE: 69 MMHG

## 2024-04-10 DIAGNOSIS — Z20.2 POTENTIAL EXPOSURE TO STD: Primary | ICD-10-CM

## 2024-04-10 PROCEDURE — 87491 CHLMYD TRACH DNA AMP PROBE: CPT | Performed by: PHYSICIAN ASSISTANT

## 2024-04-10 PROCEDURE — 99213 OFFICE O/P EST LOW 20 MIN: CPT

## 2024-04-10 PROCEDURE — 87591 N.GONORRHOEAE DNA AMP PROB: CPT | Performed by: PHYSICIAN ASSISTANT

## 2024-04-10 PROCEDURE — 99214 OFFICE O/P EST MOD 30 MIN: CPT

## 2024-04-10 RX ORDER — DOXYCYCLINE HYCLATE 100 MG/1
100 CAPSULE ORAL 2 TIMES DAILY
Qty: 14 CAPSULE | Refills: 0 | Status: SHIPPED | OUTPATIENT
Start: 2024-04-10 | End: 2024-04-17

## 2024-04-10 NOTE — ED INITIAL ASSESSMENT (HPI)
Pt stated his partner told him yesterday she is positive for std and wants to be tested/treated today

## 2024-04-10 NOTE — ED PROVIDER NOTES
Patient Seen in: [facility]      History     Chief Complaint   Patient presents with    STD     Stated Complaint: STD Screening    Subjective:   HPI    29-year-old male here with complaint of stating that it burns when he urinates.  Patient reports that his partner told him she was positive for an STD.  Had chlamydia in the past.  Patient states that he wants to be treated today.  Denies chest pain, shortness of breath, cough, abdominal pain, nausea, vomiting or diarrhea.  Patient is unaware of hematuria denies flank pain.  Afebrile.    Objective:   Past Medical History:    Asthma (HCC)    Chlamydia    Migraines              History reviewed. No pertinent surgical history.           The patient's medication list, past medical history and social history elements  as listed in today's nurse's notes are reviewed and agree.   The patient's family history is reviewed and is noncontributory to the presenting problem, except as indicated as above.     Social History     Socioeconomic History    Marital status: Single   Tobacco Use    Smoking status: Some Days     Current packs/day: 0.25     Average packs/day: 0.3 packs/day for 8.0 years (2.0 ttl pk-yrs)     Types: Cigarettes    Smokeless tobacco: Never    Tobacco comments:     [place] wraps    Vaping Use    Vaping status: Former    Substances: Flavoring    Devices: Disposable   Substance and Sexual Activity    Alcohol use: Yes     Comment: occ    Drug use: Yes     Frequency: 7.0 times per week     Types: Cannabis     Comment: smoking marijuana daily    Other Topics Concern    Caffeine Concern Yes     Comment: 1 cup of coffee weekly.     Exercise Yes     Comment: 2x/week    Reaction to local anesthetic No   Social History Narrative    ** Merged History Encounter **          Social Determinants of Health      Received from [facility]              Review of Systems    Positive for stated complaint: STD Screening  Other systems are as noted  in HPI.  Constitutional and vital signs reviewed.      All other systems reviewed and negative except as noted above.    Physical Exam     ED Triage Vitals [04/10/24 1226]   /69   Pulse 81   Resp 16   Temp 98.2 °F (36.8 °C)   Temp src Oral   SpO2 98 %   O2 Device None (Room air)       Current:/69   Pulse 81   Temp 98.2 °F (36.8 °C) (Oral)   Resp 16   Ht 188 cm (6' 2\")   Wt 95.7 kg   SpO2 98%   BMI 27.09 kg/m²         Physical Exam  Vitals and nursing note reviewed.   Constitutional:       Appearance: Normal appearance. He is well-developed.   HENT:      Head: Normocephalic.      Right Ear: External ear normal.      Left Ear: External ear normal.      Nose: Nose normal.      Mouth/Throat:      Mouth: Mucous membranes are moist.   Eyes:      Conjunctiva/sclera: Conjunctivae normal.      Pupils: Pupils are equal, round, and reactive to light.   Cardiovascular:      Rate and Rhythm: Normal rate and regular rhythm.      Heart sounds: Normal heart sounds.   Pulmonary:      Effort: Pulmonary effort is normal.      Breath sounds: Normal breath sounds.   Musculoskeletal:      Cervical back: Normal range of motion and neck supple.   Skin:     General: Skin is warm.      Capillary Refill: Capillary refill takes less than 2 seconds.   Neurological:      General: No focal deficit present.      Mental Status: He is alert and oriented to person, place, and time.   Psychiatric:         Mood and Affect: Mood normal.         Behavior: Behavior normal.         Thought Content: Thought content normal.         Judgment: Judgment normal.             ED Course     Labs Reviewed   CHLAMYDIA/GONOCOCCUS, AIDAN                      MDM         Clinical Impression: ST exposure  Course of Treatment:   Take the antibiotics as prescribed in tandem with a probiotic daily.  Make a follow-up appointment with your primary care physician and/or [facility] for further evaluation and treatment i.e. HIV and syphilis testing.   Wear condoms.  We will follow-up with the results of the additional cultures.        The patient is encouraged to return if any concerning symptoms arise. Additional verbal discharge instructions are given and discussed. Discharge medications are discussed. The patient is in good condition throughout the visit today and remains so upon discharge. I discuss the plan of care with the patient, who expresses understanding. All questions and concerns are addressed to the patient's satisfaction prior to discharge today.  Previous conversations with PCP and charts were reviewed.                                     Disposition and Plan     Clinical Impression:  1. Potential exposure to STD         Disposition:  Discharge  4/10/2024 12:53 pm    Follow-up:  Brigid Schimdt MD  [address] [address] [phone]                Medications Prescribed:  Current Discharge Medication List        START taking these medications    Details   doxycycline 100 MG Oral Cap Take 1 capsule (100 mg total) by mouth 2 (two) times daily for 7 days.  Qty: 14 capsule, Refills: 0

## 2024-04-11 LAB
C TRACH DNA SPEC QL NAA+PROBE: NEGATIVE
N GONORRHOEA DNA SPEC QL NAA+PROBE: NEGATIVE

## 2025-01-09 ENCOUNTER — HOSPITAL ENCOUNTER (EMERGENCY)
Age: 30
Discharge: HOME | End: 2025-01-09
Attending: EMERGENCY MEDICINE
Payer: MEDICAID

## 2025-01-09 VITALS
HEIGHT: 75 IN | BODY MASS INDEX: 26.24 KG/M2 | DIASTOLIC BLOOD PRESSURE: 62 MMHG | WEIGHT: 211 LBS | SYSTOLIC BLOOD PRESSURE: 109 MMHG | TEMPERATURE: 98 F | OXYGEN SATURATION: 100 % | HEART RATE: 95 BPM | RESPIRATION RATE: 18 BRPM

## 2025-01-09 DIAGNOSIS — U07.1 COVID-19: Primary | ICD-10-CM

## 2025-01-09 LAB
POCT INFLUENZA A: NEGATIVE
POCT INFLUENZA B: NEGATIVE
SARS-COV-2 RNA RESP QL NAA+PROBE: DETECTED

## 2025-01-09 PROCEDURE — 87502 INFLUENZA DNA AMP PROBE: CPT | Performed by: EMERGENCY MEDICINE

## 2025-01-09 PROCEDURE — 87430 STREP A AG IA: CPT | Performed by: EMERGENCY MEDICINE

## 2025-01-09 PROCEDURE — 99283 EMERGENCY DEPT VISIT LOW MDM: CPT

## 2025-01-09 PROCEDURE — 99284 EMERGENCY DEPT VISIT MOD MDM: CPT

## 2025-01-09 RX ORDER — ONDANSETRON 4 MG/1
4 TABLET, ORALLY DISINTEGRATING ORAL EVERY 6 HOURS PRN
Qty: 15 TABLET | Refills: 0 | Status: SHIPPED | OUTPATIENT
Start: 2025-01-09 | End: 2025-01-16

## 2025-01-09 RX ORDER — ONDANSETRON 4 MG/1
4 TABLET, FILM COATED ORAL ONCE
Status: COMPLETED | OUTPATIENT
Start: 2025-01-09 | End: 2025-01-09

## 2025-01-09 RX ORDER — ACETAMINOPHEN 500 MG
1000 TABLET ORAL ONCE
Status: COMPLETED | OUTPATIENT
Start: 2025-01-09 | End: 2025-01-09

## 2025-01-10 NOTE — ED PROVIDER NOTES
Patient Seen in: [facility] [place]      History     Chief Complaint   Patient presents with    Nausea/Vomiting/Diarrhea     Stated Complaint: cough, bodyaches    Subjective:   HPI      30-year-old male presents for evaluation of fever, cough, sore throat and bodyaches for the past 2 days.  He has also developed both vomiting and diarrhea over the last 24 hours.  No chest pain or shortness of breath.  No abdominal pain.  No known sick contacts.    Objective:     Past Medical History:    Asthma (HCC)    Chlamydia    Migraines              History reviewed. No pertinent surgical history.             Social History     Socioeconomic History    Marital status: Single   Tobacco Use    Smoking status: Some Days     Current packs/day: 0.25     Average packs/day: 0.3 packs/day for 8.0 years (2.0 ttl pk-yrs)     Types: Cigarettes    Smokeless tobacco: Never    Tobacco comments:     [place] wraps    Vaping Use    Vaping status: Former    Substances: Flavoring    Devices: Disposable   Substance and Sexual Activity    Alcohol use: Not Currently     Comment: occ    Drug use: Yes     Frequency: 7.0 times per week     Types: Cannabis     Comment: smoking marijuana daily    Other Topics Concern    Caffeine Concern Yes     Comment: 1 cup of coffee weekly.     Exercise Yes     Comment: 2x/week    Reaction to local anesthetic No   Social History Narrative    ** Merged History Encounter **          Social Drivers of Health      Received from [facility]                  Physical Exam     ED Triage Vitals [01/09/25 1759]   /66   Pulse 102   Resp 17   Temp (!) 100.5 °F (38.1 °C)   Temp src Oral   SpO2 96 %   O2 Device None (Room air)       Current Vitals:   Vital Signs  BP: 101/66  Pulse: 102  Resp: 17  Temp: (!) 100.5 °F (38.1 °C)  Temp src: Oral    Oxygen Therapy  SpO2: 96 %  O2 Device: None (Room air)        Physical Exam     General: Alert, oriented, no apparent distress  HEENT:  Pupils  equal reactive.  Extraocular motions intact. MMM.  Oropharynx erythematous, uvula midline  Neck: Supple  Lungs: Clear to auscultation bilaterally.  Heart: Regular rate and rhythm.  Abdomen: Soft, nontender.  No splenic enlargement.  Skin: No rash.  No edema.  Neurologic: No focal neurologic deficits.  Normal speech pattern  Musculoskeletal: No tenderness or deformity noted.  Full range of motion throughout.      ED Course     Labs Reviewed   RAPID SARS-COV-2 BY PCR - Abnormal; Notable for the following components:       Result Value    Rapid SARS-CoV-2 by PCR Detected (*)     All other components within normal limits   POCT FLU TEST - Normal    Narrative:     This assay is a rapid molecular in vitro test utilizing nucleic acid amplification of influenza A and B viral RNA.   RAPID STREP A SCREEN (LC) - Normal    Narrative:     A confirmatory culture is recommended if clinically indicated.                   MDM      Differential diagnosis includes influenza, COVID, pneumonia, strep throat, peritonsillar abscess    No evidence of PTA on exam.  Lungs are clear.    COVID-positive, flu negative    Strep negative    Medications   acetaminophen (Tylenol Extra Strength) tab 1,000 mg (1,000 mg Oral Given 1/9/25 1813)   ondansetron (Zofran) tab 4 mg (4 mg Oral Given 1/9/25 1813)     Tylenol and Motrin dosing discussed.  Return for increasing shortness of breath, weakness or new symptoms    Medical Decision Making  Amount and/or Complexity of Data Reviewed  Independent Historian: parent     Details: Mother at bedside        Disposition and Plan     Clinical Impression:  1. COVID-19         Disposition:  There is no disposition on file for this visit.  There is no disposition time on file for this visit.    Follow-up:  Consuelo Barnett  [address] [address]  [phone]    Call            Medications Prescribed:  Current Discharge Medication List              Supplementary Documentation:

## 2025-03-13 NOTE — TELEPHONE ENCOUNTER
1st HPV 10/11/19. Orders pending, as pt will be in today for vaccine. [employer]  Diet Recommendation 1: Heart Healthy  Diet Recommendation 2: Healthy Eating  Food Intolerance Avoidance: nkfa  Nutrition Goals Stated: Provided [employer] farmer voucher to pt for additional resources  Household Size: 1 Family Member  Interventions: Referral Number: 6th 6 Mo Referral 3 yrs (Referrals may not be consecutive)  Interventions: Visit Number: 2 of 6 Visits - Max 6 Visits/Referral Each 6 Mo Period  Education Today: [employer]  Follow Up Notes for Future Visits: Needs new referral  Grains: Whole - 100%  Fruit: Fresh - 100%  Vegetables: Fresh - 100%  Proteins: 1-2 Plant-based Items  Dairy: Lowfat - 100%  Relevant [employer] Inpatient Discharge Items: needs new referral, unable to locate doctor who had previously provided referral  Originating Site of Referral Order: [facility]  Initials of RD Assisting Today: MB

## 2025-03-24 ENCOUNTER — HOSPITAL ENCOUNTER (OUTPATIENT)
Age: 30
Discharge: HOME | End: 2025-03-24
Payer: MEDICAID

## 2025-03-24 VITALS
WEIGHT: 211 LBS | TEMPERATURE: 98 F | HEIGHT: 76 IN | HEART RATE: 77 BPM | OXYGEN SATURATION: 100 % | BODY MASS INDEX: 25.69 KG/M2 | RESPIRATION RATE: 18 BRPM | SYSTOLIC BLOOD PRESSURE: 135 MMHG | DIASTOLIC BLOOD PRESSURE: 75 MMHG

## 2025-03-24 DIAGNOSIS — R30.0 DYSURIA: Primary | ICD-10-CM

## 2025-03-24 DIAGNOSIS — Z11.3 ENCOUNTER FOR SCREENING EXAMINATION FOR SEXUALLY TRANSMITTED DISEASE: ICD-10-CM

## 2025-03-24 LAB
BILIRUB UR QL STRIP: NEGATIVE
CLARITY UR: CLEAR
COLOR UR: YELLOW
GLUCOSE UR STRIP-MCNC: NEGATIVE MG/DL
HGB UR QL STRIP: NEGATIVE
KETONES UR STRIP-MCNC: NEGATIVE MG/DL
LEUKOCYTE ESTERASE UR QL STRIP: NEGATIVE
NITRITE UR QL STRIP: NEGATIVE
PH UR STRIP: 6 [PH]
PROT UR STRIP-MCNC: NEGATIVE MG/DL
SP GR UR STRIP: 1.01
UROBILINOGEN UR STRIP-ACNC: <2 MG/DL

## 2025-03-24 PROCEDURE — 81002 URINALYSIS NONAUTO W/O SCOPE: CPT

## 2025-03-24 PROCEDURE — 99214 OFFICE O/P EST MOD 30 MIN: CPT

## 2025-03-24 PROCEDURE — 87491 CHLMYD TRACH DNA AMP PROBE: CPT | Performed by: NURSE PRACTITIONER

## 2025-03-24 PROCEDURE — 87591 N.GONORRHOEAE DNA AMP PROB: CPT | Performed by: NURSE PRACTITIONER

## 2025-03-24 PROCEDURE — 99213 OFFICE O/P EST LOW 20 MIN: CPT

## 2025-03-24 PROCEDURE — 96372 THER/PROPH/DIAG INJ SC/IM: CPT

## 2025-03-24 RX ORDER — DOXYCYCLINE 100 MG/1
100 CAPSULE ORAL 2 TIMES DAILY
Qty: 14 CAPSULE | Refills: 0 | Status: SHIPPED | OUTPATIENT
Start: 2025-03-24 | End: 2025-03-31

## 2025-03-24 NOTE — ED INITIAL ASSESSMENT (HPI)
Pt c/o tingling  with urination.   Denies penile discharge. Pt is sexually active with one partner without protection

## 2025-03-24 NOTE — ED PROVIDER NOTES
Patient Seen in: [facility]      History     Chief Complaint   Patient presents with    STD     Stated Complaint: STD Check    Subjective:   HPI    Patient is a 30-year-old male with history of chlamydia here for evaluation of dysuria and requesting STD testing.  Patient states \"tingling\" sensation with voiding started about 8 to 10 days ago.  Denies discharge from penis, rashes, blisters or unusual lesions.  Denies testicular pain or swelling.    1 sexual partner, unprotected, no known exposure to STI.     No change in products.     Objective:     Past Medical History:    Asthma (HCC)    Chlamydia    Migraines              History reviewed. No pertinent surgical history.             No pertinent social history.            Review of Systems    Positive for stated complaint: STD Check  Other systems are as noted in HPI.  Constitutional and vital signs reviewed.      All other systems reviewed and negative except as noted above.    Physical Exam     ED Triage Vitals [03/24/25 0956]   /75   Pulse 77   Resp 18   Temp 98.1 °F (36.7 °C)   Temp src Oral   SpO2 100 %   O2 Device None (Room air)       Current Vitals:   Vital Signs  BP: 135/75  Pulse: 77  Resp: 18  Temp: 98.1 °F (36.7 °C)  Temp src: Oral    Oxygen Therapy  SpO2: 100 %  O2 Device: None (Room air)        Physical Exam  Vitals and nursing note reviewed.   Constitutional:       General: He is not in acute distress.     Appearance: Normal appearance. He is not ill-appearing, toxic-appearing or diaphoretic.   HENT:      Mouth/Throat:      Mouth: Mucous membranes are moist.   Eyes:      Conjunctiva/sclera: Conjunctivae normal.      Pupils: Pupils are equal, round, and reactive to light.   Cardiovascular:      Rate and Rhythm: Normal rate.   Pulmonary:      Effort: Pulmonary effort is normal.   Neurological:      Mental Status: He is alert.           ED Course     Labs Reviewed   [facility] POCT URINALYSIS DIPSTICK   CHLAMYDIA/GONOCOCCUS, AIDAN              [facility]            Medical Decision Making  Differentials include but are not limited to urethritis, STI and UTI.  Urinalysis completely unremarkable here.  Urine GC/Cellfina pending.  Empiric treatment of Rocephin given and doxycycline sent to pharmacy for patient to start.  Refrain from sexual activity for 2 weeks with STI test pending.  Patient agrees with plan of care.  All questions answered to patient's satisfaction    Amount and/or Complexity of Data Reviewed  Labs: ordered. Decision-making details documented in ED Course.        Disposition and Plan     Clinical Impression:  1. Dysuria    2. Encounter for screening examination for sexually transmitted disease         Disposition:  Discharge  3/24/2025 10:29 am    Follow-up:  No follow-up provider specified.        Medications Prescribed:  Discharge Medication List as of 3/24/2025 10:37 AM        START taking these medications    Details   doxycycline 100 MG Oral Cap Take 1 capsule (100 mg total) by mouth 2 (two) times daily for 7 days., Normal, Disp-14 capsule, R-0                 Supplementary Documentation:

## 2025-03-25 LAB
C TRACH DNA SPEC QL NAA+PROBE: NEGATIVE
N GONORRHOEA DNA SPEC QL NAA+PROBE: NEGATIVE

## 2025-05-20 ENCOUNTER — TELEPHONE (OUTPATIENT)
Dept: INTERNAL MEDICINE CLINIC | Facility: CLINIC | Age: 30
End: 2025-05-20

## 2025-05-20 NOTE — TELEPHONE ENCOUNTER
Received a medical records request from:    [facility]  [address]  [address]  [facility] [address]    Fax [phone]  Contact Margo Slater w/ any questions  P: [phone]
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
## (undated) NOTE — ED AVS SNAPSHOT
[facility] in [address] [place] [address] Drive,[address]Th Floor    [facility]    Phone:  [phone]    Fax:  Yrnaziza Deepak Stevie WadeWarrendejan Borja   MRN: [id number]    Department:  THE [facility] OF [facility] in [facility] END   Date of Visit:  5/1/ Discharge Instructions         Received Rocephin 150 mg IM while here in immediate care, also covered with 1 g of azithromycin p.o. while here in immediate care  Advised sexual abstinence at least for 2-3 weeks, will need to have his partner also treated i nuestro adminstrador de ganesh jason [phone]. Expect to receive an electronic request (by e-mail or text) to complete a self-assessment the day after your visit. You may also receive a call from our patient liason soon after your visit.  Also, some [facility] [address] ([address]) [address] [facility] [address]  ([address]) [address] [facility]. ([address]) [address] [facility] [address]. (8 not sign up before the expiration date, you must request a new code. Your unique [place] Access Code: Marian Moore  Expires: 6/30/2017  1:14 PM    If you have questions, you can call [phone] to talk to our [facility] Staff.  Remember, [place]

## (undated) NOTE — LETTER
ASTHMA ACTION PLAN for Rylan Prado     : 1995     Date: 2022  Provider:  Natalie Vora MD  Phone for doctor or clinic: [address]  [address]  Leela Hernandez3 [id number] [phone]    ACT Score: 23      You can use the colors of a traffic light to help learn about your asthma medicines. 1. Green - Go! % of Personal Best Peak Flow Use controller medicine. Breathing is good  No cough or wheeze  Can work and play Medicine How much to take When to take it    No Daily Medicine       2. Yellow - Caution. 50-79% Personal Best Peak  Flow. Use reliever medicine to keep an asthma attack from getting bad. Cough  Wheezing  Tight Chest  Wake up at night Medicine How much to take When to take it    albuterol 108 (90 Base) MCG/ACT Inhalation Aero Soln    Inhale 2 puffs into the lungs 4 (four) times daily as needed. For wheezing or shortness of breath       Additional instructions         3. Red - Stop! Danger!  <50% Personal Best Peak  Flow. Take these medications until  Get help from a doctor   Medicine not helping  Breathing is hard and fast  Nose opens wide  Can't walk  Ribs show  Can't talk well Medicine How much to take When to take it    CALL 911! GO TO THE EMERGENCY ROOM      Additional Instructions If your symptoms do not improve and you cannot contact your doctor, [facility] or call 911 immediately! [x] Asthma Action Plan reviewed with patient (and caregiver if necessary) and a copy of the plan was given to the patient/caregiver. [] Asthma Action Plan reviewed with patient (and caregiver if necessary) on the phone and mailed copy to patient or submitted via [address]      Signatures:  Provider  Natalie Vora MD   Patient Caretaker

## (undated) NOTE — LETTER
[facility] CARE IN [place]  [address] [address]  Dept: [phone]  Dept Fax: [phone]         January 1, 2018    Patient: Shannan Garcia   YOB: 1995   Date of Visit: 1/1/2018       To Whom It May C

## (undated) NOTE — LETTER
04/02/22        Derrick Loya Mt  [address] [facility]      Dear Marietta Greene,    [address] [facility] records indicate that you have outstanding lab work and or testing that was ordered for you and has not yet been completed:      MRI THIGH (W+WO), LEFT (CPT=73720)    To provide you with the best possible care, please complete these orders at your earliest convenience. If you have recently completed these orders please disregard this letter. If you have any questions please call the office at Dept: [phone].      Thank you,       Bhupendra Garza MD

## (undated) NOTE — LETTER
Date & Time: 7/27/2019, 4:00 PM  Patient: Anna Ramírez  Encounter Provider(s):    AMANDA Mena       To Whom It May Concern:    Anna Ramírez was seen and treated in our department on 7/27/2019.  He should not return to wor

## (undated) NOTE — LETTER
ASTHMA ACTION PLAN for Katy Rodríguez Cristiane     : 1995     Date: 2020  Provider:  Julia Fontaine MD  Phone for doctor or clinic: [facility], [address] [place] [address], NADEGE/ Karen 23  [address] Basilia Garcia, [facility] 100  Leela Lester [phone]

## (undated) NOTE — LETTER
[facility] CARE IN [place]  [address] [address]  Dept: [phone]  Dept Fax: [phone]         January 9, 2019    Patient: Anna Ramírez   YOB: 1995   Date of Visit: 1/9/2019       To Whom It May C

## (undated) NOTE — LETTER
ASTHMA ACTION PLAN for Fredy Ruedalc Dahl     : 1995     Date: 10/1/2019  Provider:  AMANDA Harrison  Phone for doctor or clinic: [address], [address] [place] [address], / Karen [facility] [address] [address]  Susan Osorio [phone]